# Patient Record
Sex: MALE | Race: BLACK OR AFRICAN AMERICAN | Employment: FULL TIME | URBAN - METROPOLITAN AREA
[De-identification: names, ages, dates, MRNs, and addresses within clinical notes are randomized per-mention and may not be internally consistent; named-entity substitution may affect disease eponyms.]

---

## 2017-09-20 ENCOUNTER — APPOINTMENT (OUTPATIENT)
Dept: CT IMAGING | Age: 52
DRG: 069 | End: 2017-09-20
Attending: EMERGENCY MEDICINE
Payer: COMMERCIAL

## 2017-09-20 ENCOUNTER — APPOINTMENT (OUTPATIENT)
Dept: MRI IMAGING | Age: 52
DRG: 069 | End: 2017-09-20
Attending: INTERNAL MEDICINE
Payer: COMMERCIAL

## 2017-09-20 ENCOUNTER — HOSPITAL ENCOUNTER (INPATIENT)
Age: 52
LOS: 1 days | Discharge: HOME | DRG: 069 | End: 2017-09-21
Attending: EMERGENCY MEDICINE | Admitting: INTERNAL MEDICINE
Payer: COMMERCIAL

## 2017-09-20 DIAGNOSIS — I65.23 STENOSIS OF BOTH INTERNAL CAROTID ARTERIES: ICD-10-CM

## 2017-09-20 DIAGNOSIS — R20.2 PARESTHESIA OF LEFT ARM AND LEG: Primary | ICD-10-CM

## 2017-09-20 DIAGNOSIS — G45.9 TRANSIENT CEREBRAL ISCHEMIA, UNSPECIFIED TYPE: ICD-10-CM

## 2017-09-20 DIAGNOSIS — R93.89 ABNORMAL MRI: ICD-10-CM

## 2017-09-20 DIAGNOSIS — E78.5 DYSLIPIDEMIA: ICD-10-CM

## 2017-09-20 DIAGNOSIS — I67.89 CEREBRAL MICROVASCULAR DISEASE: ICD-10-CM

## 2017-09-20 PROBLEM — I63.9 STROKE (CEREBRUM) (HCC): Status: ACTIVE | Noted: 2017-09-20

## 2017-09-20 LAB
ALBUMIN SERPL-MCNC: 3.9 G/DL (ref 3.5–5)
ALBUMIN/GLOB SERPL: 0.8 {RATIO} (ref 1.1–2.2)
ALP SERPL-CCNC: 59 U/L (ref 45–117)
ALT SERPL-CCNC: 18 U/L (ref 12–78)
ANION GAP SERPL CALC-SCNC: 7 MMOL/L (ref 5–15)
APPEARANCE UR: CLEAR
AST SERPL-CCNC: 17 U/L (ref 15–37)
ATRIAL RATE: 67 BPM
BACTERIA URNS QL MICRO: NEGATIVE /HPF
BASOPHILS # BLD: 0 K/UL (ref 0–0.1)
BASOPHILS NFR BLD: 0 % (ref 0–1)
BILIRUB SERPL-MCNC: 0.3 MG/DL (ref 0.2–1)
BILIRUB UR QL: NEGATIVE
BUN SERPL-MCNC: 7 MG/DL (ref 6–20)
BUN/CREAT SERPL: 10 (ref 12–20)
CALCIUM SERPL-MCNC: 8.9 MG/DL (ref 8.5–10.1)
CALCULATED P AXIS, ECG09: 47 DEGREES
CALCULATED R AXIS, ECG10: 68 DEGREES
CALCULATED T AXIS, ECG11: 51 DEGREES
CHLORIDE SERPL-SCNC: 104 MMOL/L (ref 97–108)
CO2 SERPL-SCNC: 25 MMOL/L (ref 21–32)
COLOR UR: NORMAL
CREAT SERPL-MCNC: 0.68 MG/DL (ref 0.7–1.3)
DIAGNOSIS, 93000: NORMAL
EOSINOPHIL # BLD: 0.1 K/UL (ref 0–0.4)
EOSINOPHIL NFR BLD: 1 % (ref 0–7)
EPITH CASTS URNS QL MICRO: NORMAL /LPF
ERYTHROCYTE [DISTWIDTH] IN BLOOD BY AUTOMATED COUNT: 12.4 % (ref 11.5–14.5)
GLOBULIN SER CALC-MCNC: 4.9 G/DL (ref 2–4)
GLUCOSE SERPL-MCNC: 89 MG/DL (ref 65–100)
GLUCOSE UR STRIP.AUTO-MCNC: NEGATIVE MG/DL
HCT VFR BLD AUTO: 41.5 % (ref 36.6–50.3)
HGB BLD-MCNC: 14.1 G/DL (ref 12.1–17)
HGB UR QL STRIP: NEGATIVE
INR PPP: 1 (ref 0.9–1.1)
KETONES UR QL STRIP.AUTO: NEGATIVE MG/DL
LEUKOCYTE ESTERASE UR QL STRIP.AUTO: NEGATIVE
LYMPHOCYTES # BLD: 3.3 K/UL (ref 0.8–3.5)
LYMPHOCYTES NFR BLD: 47 % (ref 12–49)
MCH RBC QN AUTO: 32.3 PG (ref 26–34)
MCHC RBC AUTO-ENTMCNC: 34 G/DL (ref 30–36.5)
MCV RBC AUTO: 95.2 FL (ref 80–99)
MONOCYTES # BLD: 0.7 K/UL (ref 0–1)
MONOCYTES NFR BLD: 9 % (ref 5–13)
NEUTS SEG # BLD: 3 K/UL (ref 1.8–8)
NEUTS SEG NFR BLD: 43 % (ref 32–75)
NITRITE UR QL STRIP.AUTO: NEGATIVE
P-R INTERVAL, ECG05: 166 MS
PH UR STRIP: 7.5 [PH] (ref 5–8)
PLATELET # BLD AUTO: 229 K/UL (ref 150–400)
POTASSIUM SERPL-SCNC: 3.9 MMOL/L (ref 3.5–5.1)
PROT SERPL-MCNC: 8.8 G/DL (ref 6.4–8.2)
PROT UR STRIP-MCNC: NEGATIVE MG/DL
PROTHROMBIN TIME: 10.3 SEC (ref 9–11.1)
Q-T INTERVAL, ECG07: 394 MS
QRS DURATION, ECG06: 92 MS
QTC CALCULATION (BEZET), ECG08: 416 MS
RBC # BLD AUTO: 4.36 M/UL (ref 4.1–5.7)
RBC #/AREA URNS HPF: NORMAL /HPF (ref 0–5)
SODIUM SERPL-SCNC: 136 MMOL/L (ref 136–145)
SP GR UR REFRACTOMETRY: 1.01 (ref 1–1.03)
TROPONIN I SERPL-MCNC: <0.04 NG/ML
UA: UC IF INDICATED,UAUC: NORMAL
UROBILINOGEN UR QL STRIP.AUTO: 0.2 EU/DL (ref 0.2–1)
VENTRICULAR RATE, ECG03: 67 BPM
WBC # BLD AUTO: 7.1 K/UL (ref 4.1–11.1)
WBC URNS QL MICRO: NORMAL /HPF (ref 0–4)

## 2017-09-20 PROCEDURE — 99285 EMERGENCY DEPT VISIT HI MDM: CPT

## 2017-09-20 PROCEDURE — 93005 ELECTROCARDIOGRAM TRACING: CPT

## 2017-09-20 PROCEDURE — 80053 COMPREHEN METABOLIC PANEL: CPT | Performed by: EMERGENCY MEDICINE

## 2017-09-20 PROCEDURE — 74011250637 HC RX REV CODE- 250/637: Performed by: EMERGENCY MEDICINE

## 2017-09-20 PROCEDURE — 85610 PROTHROMBIN TIME: CPT | Performed by: EMERGENCY MEDICINE

## 2017-09-20 PROCEDURE — 36415 COLL VENOUS BLD VENIPUNCTURE: CPT | Performed by: EMERGENCY MEDICINE

## 2017-09-20 PROCEDURE — 70551 MRI BRAIN STEM W/O DYE: CPT

## 2017-09-20 PROCEDURE — 85025 COMPLETE CBC W/AUTO DIFF WBC: CPT | Performed by: EMERGENCY MEDICINE

## 2017-09-20 PROCEDURE — 81001 URINALYSIS AUTO W/SCOPE: CPT | Performed by: EMERGENCY MEDICINE

## 2017-09-20 PROCEDURE — 70450 CT HEAD/BRAIN W/O DYE: CPT

## 2017-09-20 PROCEDURE — 65660000000 HC RM CCU STEPDOWN

## 2017-09-20 PROCEDURE — 84484 ASSAY OF TROPONIN QUANT: CPT | Performed by: EMERGENCY MEDICINE

## 2017-09-20 RX ORDER — SODIUM CHLORIDE 0.9 % (FLUSH) 0.9 %
5-10 SYRINGE (ML) INJECTION AS NEEDED
Status: DISCONTINUED | OUTPATIENT
Start: 2017-09-20 | End: 2017-09-21 | Stop reason: HOSPADM

## 2017-09-20 RX ORDER — ACETAMINOPHEN 650 MG/1
650 SUPPOSITORY RECTAL
Status: DISCONTINUED | OUTPATIENT
Start: 2017-09-20 | End: 2017-09-21 | Stop reason: HOSPADM

## 2017-09-20 RX ORDER — GUAIFENESIN 100 MG/5ML
81 LIQUID (ML) ORAL DAILY
Status: DISCONTINUED | OUTPATIENT
Start: 2017-09-21 | End: 2017-09-21 | Stop reason: HOSPADM

## 2017-09-20 RX ORDER — GUAIFENESIN 100 MG/5ML
162 LIQUID (ML) ORAL
Status: COMPLETED | OUTPATIENT
Start: 2017-09-20 | End: 2017-09-20

## 2017-09-20 RX ORDER — LABETALOL HYDROCHLORIDE 5 MG/ML
5 INJECTION, SOLUTION INTRAVENOUS
Status: DISCONTINUED | OUTPATIENT
Start: 2017-09-20 | End: 2017-09-21 | Stop reason: HOSPADM

## 2017-09-20 RX ORDER — SODIUM CHLORIDE 0.9 % (FLUSH) 0.9 %
5-10 SYRINGE (ML) INJECTION EVERY 8 HOURS
Status: DISCONTINUED | OUTPATIENT
Start: 2017-09-20 | End: 2017-09-21 | Stop reason: HOSPADM

## 2017-09-20 RX ORDER — ACETAMINOPHEN 325 MG/1
650 TABLET ORAL
Status: DISCONTINUED | OUTPATIENT
Start: 2017-09-20 | End: 2017-09-21 | Stop reason: HOSPADM

## 2017-09-20 RX ORDER — IBUPROFEN 200 MG
400 TABLET ORAL
COMMUNITY

## 2017-09-20 RX ADMIN — ASPIRIN 81 MG 162 MG: 81 TABLET ORAL at 16:50

## 2017-09-20 RX ADMIN — Medication 10 ML: at 21:10

## 2017-09-20 NOTE — H&P
Hospitalist Admission Note    NAME: Boris Lowe   :  1965   MRN:  [id number]     Date/Time:  2017 6:03 PM    Patient PCP: None  ________________________________________________________________________    My assessment of this patient's clinical condition and my plan of care is as follows. Assessment / Plan:  Left upper and lower extremity numbness concerning for stroke  -patient with uncontrolled hypertension, does have evidence of old stroke on CT head   -aspirin 81 mg started, given aspirin 162 mg in ED  -neurology consult  -permissive HTN, labetalol PRN SBP >220  -MRI ordered  -carotid doppler ordered  -echo ordered   -check lipid panel and HbA1c  -swallow screen  -PT/OT consults    Uncontrolled hypertension  -permissive HTN for now  -recommend starting oral antihypertensive tomorrow    Tobacco use  -nicotine patch if needed      Code Status: full  Surrogate Decision Maker: sister Son Fails [phone]    DVT Prophylaxis: scd  GI Prophylaxis: not indicated    Baseline: independent        Subjective:   CHIEF COMPLAINT: left arm and leg numbness    HISTORY OF PRESENT ILLNESS:     Boris Lowe is a 49 y.o.  male with history of hypertension who presents with 12 hour history of left arm and leg numbness. Symptoms started this AM as patient was getting out of the car to go to work. His entire forearm and leg felt numb, he thought his arm \"fell asleep\" however the symptoms did not resolve. Denies any weakness the numbness is now reduced to his finger tips and toes. Denies any history of stroke and is not on any medications. Is seeing a physician currently who was planning to start antihypertensives. We were asked to admit for work up and evaluation of the above problems.      Past Medical History:   Diagnosis Date    Hypertension     Liver disease         Past Surgical History:   Procedure Laterality Date    HX ORTHOPAEDIC Right     Rt hip repair Social History   Substance Use Topics    Smoking status: Current Every Day Smoker     Packs/day: 0.25    Smokeless tobacco: Never Used    Alcohol use No        Family History   Problem Relation Age of Onset    Stroke Father     Kidney Disease Brother      No Known Allergies     Prior to Admission medications    Medication Sig Start Date End Date Taking? Authorizing Provider   ibuprofen (MOTRIN) 200 mg tablet Take 400 mg by mouth every six (6) hours as needed for Pain. Yes Historical Provider   MULTIVIT-MINERALS/FA/LYCOPENE (ONE-A-DAY MEN'S MULTIVITAMIN PO) Take 1 Tab by mouth daily.    Yes Historical Provider       REVIEW OF SYSTEMS:         Total of 12 systems reviewed as follows:         General: no fever, no chills, no sweats, no generalized weakness, no weight loss, no weight gain, no loss of appetite   Eyes: no blurred vision, no eye pain, no loss of vision, no double vision  ENT: no rhinorrhea, no pharyngitis   Respiratory: no cough, no sputum production, no SOB, no FARIA, no wheezing, no pleuritic pain   Cardiology: no chest pain, no palpitations, no orthopnea, no PND, no edema, no syncope   Gastrointestinal: no abdominal pain, no N/V, no diarrhea, no dysphagia, no constipation, no bleeding   Genitourinary: no frequency, no urgency, no dysuria, no hematuria, no incontinence   Muskuloskeletal : no arthralgia, no myalgia, no back pain  Hematology: no easy bruising, no nose or gum bleeding, no lymphadenopathy   Dermatological: no rash, no ulceration, no pruritis, no color change / jaundice  Endocrine: no hot flashes, no polydipsia   Neurological: no headache, no dizziness, no confusion, no focal weakness, + paresthesia, no speech difficulties, no memory loss, no gait difficulty  Psychological: no anxiety, no depression, no agitation      Objective:   VITALS:    Patient Vitals for the past 12 hrs:   Temp Pulse Resp BP SpO2   09/20/17 1700 - - - (!) 151/100 97 %   09/20/17 1530 - - - 138/87 97 % 09/20/17 1515 - - - (!) 142/99 97 %   09/20/17 1500 - - - (!) 155/94 97 %   09/20/17 1333 98.2 °F (36.8 °C) 70 18 (!) 157/104 100 %         PHYSICAL EXAM:    General:    Alert, cooperative, no distress, appears stated age. HEENT: Atraumatic, anicteric sclerae, pink conjunctivae     No oral ulcers, oral mucosa moist, throat clear, dentition fair  Neck:  Supple, symmetrical  Lungs:   Clear to auscultation bilaterally, no wheezing, rhonchi, or rales. Chest wall:  No tenderness, no accessory muscle use. Heart:   Regular rhythm, no murmur, no edema  Abdomen:   Soft, non-tender, not distended, bowel sounds normal  Extremities: No cyanosis, no clubbing, warm, well perfused, radial pulse 2+  Skin:     Not pale, not jaundiced, no rashes   Psych:  Good insight, not depressed, not anxious or agitated. Neurologic: EOMs intact, face symmetric, no aphasia or slurred speech, strength 5/5 in BUE, 5/5 in BLE, sensation grossly intact, alert and oriented x 4.     _______________________________________________________________________  Care Plan discussed with:    Comments   Patient     Family      RN     Care Manager                    Consultant:      _______________________________________________________________________  Expected  Disposition:   Home with Family    HH/PT/OT/RN    SNF/LTC    JACOB    ________________________________________________________________________  TOTAL TIME:  61 Minutes    Critical Care Provided     Minutes non procedure based      Comments     Reviewed previous records   >50% of visit spent in counseling and coordination of care  Discussion with patient and/or family and questions answered       ________________________________________________________________________  Ignacio Ashley MD    Procedures: see electronic medical records for all procedures/Xrays and details which were not copied into this note but were reviewed prior to creation of Plan.     LAB DATA REVIEWED:    Recent Results (from the past 24 hour(s))   EKG, 12 LEAD, INITIAL    Collection Time: 09/20/17  1:38 PM   Result Value Ref Range    Ventricular Rate 67 BPM    Atrial Rate 67 BPM    P-R Interval 166 ms    QRS Duration 92 ms    Q-T Interval 394 ms    QTC Calculation (Bezet) 416 ms    Calculated P Axis 47 degrees    Calculated R Axis 68 degrees    Calculated T Axis 51 degrees    Diagnosis       Normal sinus rhythm  Minimal voltage criteria for LVH, may be normal variant  Minor nonspecific st&t changes    No previous ECGs available  Confirmed by Darrius Delgado ([id number]) on 9/20/2017 5:31:05 PM     CBC WITH AUTOMATED DIFF    Collection Time: 09/20/17  1:52 PM   Result Value Ref Range    WBC 7.1 4.1 - 11.1 K/uL    RBC 4.36 4.10 - 5.70 M/uL    HGB 14.1 12.1 - 17.0 g/dL    HCT 41.5 36.6 - 50.3 %    MCV 95.2 80.0 - 99.0 FL    MCH 32.3 26.0 - 34.0 PG    MCHC 34.0 30.0 - 36.5 g/dL    RDW 12.4 11.5 - 14.5 %    PLATELET 653 691 - 060 K/uL    NEUTROPHILS 43 32 - 75 %    LYMPHOCYTES 47 12 - 49 %    MONOCYTES 9 5 - 13 %    EOSINOPHILS 1 0 - 7 %    BASOPHILS 0 0 - 1 %    ABS. NEUTROPHILS 3.0 1.8 - 8.0 K/UL    ABS. LYMPHOCYTES 3.3 0.8 - 3.5 K/UL    ABS. MONOCYTES 0.7 0.0 - 1.0 K/UL    ABS. EOSINOPHILS 0.1 0.0 - 0.4 K/UL    ABS. BASOPHILS 0.0 0.0 - 0.1 K/UL   METABOLIC PANEL, COMPREHENSIVE    Collection Time: 09/20/17  1:52 PM   Result Value Ref Range    Sodium 136 136 - 145 mmol/L    Potassium 3.9 3.5 - 5.1 mmol/L    Chloride 104 97 - 108 mmol/L    CO2 25 21 - 32 mmol/L    Anion gap 7 5 - 15 mmol/L    Glucose 89 65 - 100 mg/dL    BUN 7 6 - 20 MG/DL    Creatinine 0.68 (L) 0.70 - 1.30 MG/DL    BUN/Creatinine ratio 10 (L) 12 - 20      GFR est AA >60 >60 ml/min/1.73m2    GFR est non-AA >60 >60 ml/min/1.73m2    Calcium 8.9 8.5 - 10.1 MG/DL    Bilirubin, total 0.3 0.2 - 1.0 MG/DL    ALT (SGPT) 18 12 - 78 U/L    AST (SGOT) 17 15 - 37 U/L    Alk.  phosphatase 59 45 - 117 U/L    Protein, total 8.8 (H) 6.4 - 8.2 g/dL    Albumin 3.9 3.5 - 5.0 g/dL    Globulin 4.9 (H) 2.0 - 4.0 g/dL    A-G Ratio 0.8 (L) 1.1 - 2.2     TROPONIN I    Collection Time: 09/20/17  1:52 PM   Result Value Ref Range    Troponin-I, Qt. <0.04 <0.05 ng/mL   URINALYSIS W/ REFLEX CULTURE    Collection Time: 09/20/17  2:02 PM   Result Value Ref Range    Color YELLOW/STRAW      Appearance CLEAR CLEAR      Specific gravity 1.013 1.003 - 1.030      pH (UA) 7.5 5.0 - 8.0      Protein NEGATIVE  NEG mg/dL    Glucose NEGATIVE  NEG mg/dL    Ketone NEGATIVE  NEG mg/dL    Bilirubin NEGATIVE  NEG      Blood NEGATIVE  NEG      Urobilinogen 0.2 0.2 - 1.0 EU/dL    Nitrites NEGATIVE  NEG      Leukocyte Esterase NEGATIVE  NEG      WBC 0-4 0 - 4 /hpf    RBC 0-5 0 - 5 /hpf    Epithelial cells FEW FEW /lpf    Bacteria NEGATIVE  NEG /hpf    UA:UC IF INDICATED CULTURE NOT INDICATED BY UA RESULT CNI     PROTHROMBIN TIME + INR    Collection Time: 09/20/17  3:31 PM   Result Value Ref Range    INR 1.0 0.9 - 1.1      Prothrombin time 10.3 9.0 - 11.1 sec

## 2017-09-20 NOTE — ED NOTES
TRANSFER - OUT REPORT:    Verbal report given to Bennie Parekh RN (name) on Arabella Gibbs  being transferred to [address] [place] [address]  (unit) for routine progression of care       Report consisted of patients Situation, Background, Assessment and   Recommendations(SBAR). Information from the following report(s) SBAR, Kardex, ED Summary, MAR, Accordion and Recent Results was reviewed with the receiving nurse. Lines:   Peripheral IV 09/20/17 Left Antecubital (Active)   Site Assessment Clean, dry, & intact 9/20/2017  3:04 PM   Phlebitis Assessment 0 9/20/2017  3:04 PM   Infiltration Assessment 0 9/20/2017  3:04 PM   Dressing Status Clean, dry, & intact 9/20/2017  3:04 PM   Dressing Type Transparent;Tape 9/20/2017  3:04 PM   Hub Color/Line Status Green 9/20/2017  3:04 PM        Opportunity for questions and clarification was provided.       Patient transported with:   [employer]

## 2017-09-20 NOTE — ED TRIAGE NOTES
Patient presents to ED ambulatory with a c/o parasthesia that started in left foot traveling up the knee and thigh later in the day. He reports that his left hand and arm is also experiencing tingling. Denies any injury or surgeries to neck or back previously.

## 2017-09-20 NOTE — IP AVS SNAPSHOT
[address] [phone] Patient: Luis Hash MRN: [id number] :1965 You are allergic to the following No active allergies Recent Documentation Height Weight BMI Smoking Status 1.803 m 68 kg 20.92 kg/m2 Current Every Day Smoker Emergency Contacts Name Discharge Info Relation Home Work Mobile Izzy Mackenzie  Other Relative [6] [phone] About your hospitalization You were admitted on:  2017 You last received care in the:  [facility] [address] [facility]UROSCI[facility]LE[facility] You were discharged on:  2017 Unit phone number:  [phone] Why you were hospitalized Your primary diagnosis was:  Not on File Your diagnoses also included:  Stroke (Cerebrum) ([facility]) Providers Seen During Your Hospitalizations Provider Role Specialty Primary office phone Gilles Perez MD Attending Provider Emergency Medicine [phone] Sandhya Toure MD Attending Provider Internal Medicine [phone] Your Primary Care Physician (PCP) Primary Care Physician Office Phone Office Fax Pineda Douglas [phone] Follow-up Information Follow up With Details Comments Contact Info Yao House NP On 10/26/2017 9:30am Neurology follow-up   Please arrive 20min early and bring a photo ID, insurance card and a list of medications [address] [place] [address] [address] [facility] 
[phone] Melody Alejo MD  check [address] [place] [address] 
[phone] Your Appointments   9:30 AM EDT New Patient with Yao House NP Neurology Clinic at [facility] [address] [facility]) [address] [facility], 
[address] [facility], [address] [facility] [address] [phone] Current Discharge Medication List  
  
 START taking these medications Dose & Instructions Dispensing Information Comments Morning Noon Evening Bedtime  
 amLODIPine 10 mg tablet Commonly known as:  Guillermo Dimas Your last dose was: Your next dose is:    
   
   
 Dose:  2.5 mg Take 0.5 Tabs by mouth daily. Quantity:  30 Tab Refills:  0  
     
   
   
   
  
 aspirin 81 mg chewable tablet Your last dose was: Your next dose is:    
   
   
 Dose:  81 mg Take 1 Tab by mouth daily. Quantity:  30 Tab Refills:  0  
     
   
   
   
  
 atorvastatin 10 mg tablet Commonly known as:  LIPITOR Your last dose was: Your next dose is:    
   
   
 Dose:  10 mg Take 1 Tab by mouth nightly. Quantity:  30 Tab Refills:  0 CONTINUE these medications which have NOT CHANGED Dose & Instructions Dispensing Information Comments Morning Noon Evening Bedtime  
 ibuprofen 200 mg tablet Commonly known as:  MOTRIN Your last dose was: Your next dose is:    
   
   
 Dose:  400 mg Take 400 mg by mouth every six (6) hours as needed for Pain. Refills:  0  
     
   
   
   
  
 ONE-A-DAY MEN'S MULTIVITAMIN PO Your last dose was: Your next dose is:    
   
   
 Dose:  1 Tab Take 1 Tab by mouth daily. Refills:  0 Where to Get Your Medications Information on where to get these meds will be given to you by the nurse or doctor. ! Ask your nurse or doctor about these medications  
  amLODIPine 10 mg tablet  
 aspirin 81 mg chewable tablet  
 atorvastatin 10 mg tablet Discharge Instructions None Discharge Orders None [facility] Announcement We are excited to announce that we are making your provider's discharge notes available to you in [employer].   You will see these notes when they are completed and signed by the physician that discharged you from your recent hospital stay. If you have any questions or concerns about any information you see in [employer], please call the Health Information Department where you were seen or reach out to your Primary Care Provider for more information about your plan of care. Introducing [facility]! Elías Farooq introduces [employer] patient portal. Now you can access parts of your medical record, email your doctor's office, and request medication refills online. 1. In your internet browser, go to [email or web address] Our Security Team/[employer] 2. Click on the First Time User? Click Here link in the Sign In box. You will see the New Member Sign Up page. 3. Enter your [employer] Access Code exactly as it appears below. You will not need to use this code after youve completed the sign-up process. If you do not sign up before the expiration date, you must request a new code. · [employer] Access Code: [email or web address] Expires: 12/19/2017  2:12 PM 
 
4. Enter the last four digits of your Social Security Number (xxxx) and Date of Birth (mm/dd/yyyy) as indicated and click Submit. You will be taken to the next sign-up page. 5. Create a [employer] ID. This will be your [employer] login ID and cannot be changed, so think of one that is secure and easy to remember. 6. Create a [employer] password. You can change your password at any time. 7. Enter your Password Reset Question and Answer. This can be used at a later time if you forget your password. 8. Enter your e-mail address. You will receive e-mail notification when new information is available in [address] 9. Click Sign Up. You can now view and download portions of your medical record. 10. Click the Download Summary menu link to download a portable copy of your medical information. If you have questions, please visit the Frequently Asked Questions section of the [employer] website. Remember, [employer] is NOT to be used for urgent needs. For medical emergencies, dial 911. Now available from your iPhone and Android! General Information Please provide this summary of care documentation to your next provider. Patient Signature:  ____________________________________________________________ Date:  ____________________________________________________________  
  
Damian Cart Provider Signature:  ____________________________________________________________ Date:  ____________________________________________________________

## 2017-09-20 NOTE — IP AVS SNAPSHOT
[address] [facility] 
[phone] Patient: Mckenna Schaffer MRN: [id number] :1965 Current Discharge Medication List  
  
START taking these medications Dose & Instructions Dispensing Information Comments Morning Noon Evening Bedtime  
 amLODIPine 10 mg tablet Commonly known as:  Viveros Fermin Your last dose was: Your next dose is:    
   
   
 Dose:  2.5 mg Take 0.5 Tabs by mouth daily. Quantity:  30 Tab Refills:  0  
     
   
   
   
  
 aspirin 81 mg chewable tablet Your last dose was: Your next dose is:    
   
   
 Dose:  81 mg Take 1 Tab by mouth daily. Quantity:  30 Tab Refills:  0  
     
   
   
   
  
 atorvastatin 10 mg tablet Commonly known as:  LIPITOR Your last dose was: Your next dose is:    
   
   
 Dose:  10 mg Take 1 Tab by mouth nightly. Quantity:  30 Tab Refills:  0 CONTINUE these medications which have NOT CHANGED Dose & Instructions Dispensing Information Comments Morning Noon Evening Bedtime  
 ibuprofen 200 mg tablet Commonly known as:  MOTRIN Your last dose was: Your next dose is:    
   
   
 Dose:  400 mg Take 400 mg by mouth every six (6) hours as needed for Pain. Refills:  0  
     
   
   
   
  
 ONE-A-DAY MEN'S MULTIVITAMIN PO Your last dose was: Your next dose is:    
   
   
 Dose:  1 Tab Take 1 Tab by mouth daily. Refills:  0 Where to Get Your Medications Information on where to get these meds will be given to you by the nurse or doctor. ! Ask your nurse or doctor about these medications  
  amLODIPine 10 mg tablet  
 aspirin 81 mg chewable tablet  
 atorvastatin 10 mg tablet

## 2017-09-20 NOTE — ROUTINE PROCESS

## 2017-09-20 NOTE — PROGRESS NOTES
Pharmacy Clarification of Prior to Admission Medication Regimen     The patient was interviewed regarding clarification of the prior to admission medication regimen, and was questioned regarding use of any other inhalers, topical products, over the counter medications, herbal medications, vitamin products or ophthalmic/nasal/otic medication use. Information Obtained From: Patient, RX Query    Pertinent Pharmacy Findings:   MULTIVIT-MINERALS/FA/LYCOPENE (ONE-A-DAY MEN'S MULTIVITAMIN PO): Patient takes this OTC agent at home, but was unaware of the strength. PTA medication list was corrected to the following:     Prior to Admission Medications   Prescriptions Last Dose Informant Patient Reported? Taking? MULTIVIT-MINERALS/FA/LYCOPENE (ONE-A-DAY MEN'S MULTIVITAMIN PO) 9/17/2017 at Unknown time Self Yes Yes   Sig: Take 1 Tab by mouth daily. ibuprofen (MOTRIN) 200 mg tablet 9/18/2017 at Unknown time Self Yes Yes   Sig: Take 400 mg by mouth every six (6) hours as needed for Pain.       Facility-Administered Medications: None          Thank you,  Stiven Ortiz CPhT  Medication History Pharmacy Technician

## 2017-09-20 NOTE — PROGRESS NOTES
TRANSFER - IN REPORT:    Verbal report received from Yudelka(name) on Shavon Solano  being received from ED(unit) for routine progression of care      Report consisted of patients Situation, Background, Assessment and   Recommendations(SBAR). Information from the following report(s) SBAR, Kardex, ED Summary, [facility] and Recent Results was reviewed with the receiving nurse. Opportunity for questions and clarification was provided. Assessment completed upon patients arrival to unit and care assumed. Primary Nurse Sandy Langford RN and Soheila López RN performed a dual skin assessment on this patient No impairment noted  Andrés score is 23    Stroke Education provided to patient and the following topics were discussed    1. Patients personal risk factors for stroke are hypertension, smoking and family history    2. Warning signs of Stroke:        * Sudden numbness or weakness of the face, arm or leg, especially on one side of          The body            * Sudden confusion, trouble speaking or understanding        * Sudden trouble seeing in one or both eyes        * Sudden trouble walking, dizziness, loss of balance or coordination        * Sudden severe headache with no known cause      3. Importance of activation Emergency Medical Services ( 9-1-1 ) immediately if experience any warning signs of stroke. 4. Be sure and schedule a follow-up appointment with your primary care doctor or any specialists as instructed. 5. You must take medicine every day to treat your risk factors for stroke. Be sure to take your medicines exactly as your doctor tells you: no more, no less. Know what your medicines are for , what they do. Anti-thrombotics /anticoagulants can help prevent strokes. 6.  Smoking and second-hand smoke greatly increase your risk of stroke, cardiovascular disease and death. Smoking history packs, 1 per day    7.  Information provided was [facility] Stroke Education Binder or Verbal Education    8. Documentation of teaching completed in Patient Education Activity and on Care Plan with teaching response noted?   yes

## 2017-09-20 NOTE — ED PROVIDER NOTES
[facility].  EMERGENCY DEPARTMENT HISTORY AND PHYSICAL EXAM       Date of Service: 9/20/2017   Patient Name: Bennett Stack   YOB: 1965  Medical Record Number: [id number]    History of Presenting Illness     Chief Complaint   Patient presents with    Numbness     per EMS pt reports tingling to L leg onset at 0630 this morning and now tingling is in L hand as well        History Provided By:  patient    Additional History: Bennett Stack is a 49 y.o. male who presents via EMS to the ED with cc of acute onset, gradually worsening paraesthesias in his LUE and LLE ~6:30 AM. Pt reports initially experiencing parasthesias in this L foot and L hand after arriving to work at [address] [place] [address] Rd., [address] this morning, which has progressed into his L leg and L arm. He states his symptoms have been constant since onset. Pt denies any similar symptoms prior to today. He specifically denies any recent facial numbness, R sided numbness, speech difficulty, unilateral weakness, HA, vision changes, CP, abdominal pain, or N/V/D. He reports a hx of hepatitis C and HTN, but does not take any daily medications. Social Hx: + Tobacco, - EtOH, - Illicit Drugs    There are no other complaints, changes or physical findings at this time. Primary Care Provider: None       Past History     Past Medical History:   Past Medical History:   Diagnosis Date    Hypertension     Liver disease         Past Surgical History:   Past Surgical History:   Procedure Laterality Date    HX ORTHOPAEDIC Right 1998    Rt hip repair        Family History:   History reviewed. No pertinent family history. Social History:   Social History   Substance Use Topics    Smoking status: Current Every Day Smoker     Packs/day: 0.25    Smokeless tobacco: Never Used    Alcohol use No        Allergies:   No Known Allergies      Review of Systems   Review of Systems   Constitutional: Negative for chills, fatigue and fever.    HENT: Negative for congestion, rhinorrhea and sore throat. Eyes: Negative for pain, discharge and visual disturbance. Respiratory: Negative for cough, chest tightness, shortness of breath and wheezing. Cardiovascular: Negative for chest pain, palpitations and leg swelling. Gastrointestinal: Negative for abdominal pain, constipation, diarrhea, nausea and vomiting. Genitourinary: Negative for dysuria, frequency and hematuria. Musculoskeletal: Negative for arthralgias, back pain and myalgias. Skin: Negative for rash. Neurological: Positive for numbness (paraesthesias LUE and LLE). Negative for dizziness, speech difficulty, weakness, light-headedness and headaches.        -facial numbness  -R sided paraesthesias   Psychiatric/Behavioral: Negative. Physical Exam  Physical Exam   Constitutional: He is oriented to person, place, and time. He appears well-developed and well-nourished. No distress. HENT:   Head: Normocephalic and atraumatic. Eyes: EOM are normal. Right eye exhibits no discharge. Left eye exhibits no discharge. No scleral icterus. Neck: Normal range of motion. Neck supple. No tracheal deviation present. Cardiovascular: Normal rate, regular rhythm, normal heart sounds and intact distal pulses. Exam reveals no gallop and no friction rub. No murmur heard. Pulmonary/Chest: Effort normal and breath sounds normal. No respiratory distress. He has no wheezes. He has no rales. Abdominal: Soft. He exhibits no distension. There is no tenderness. Musculoskeletal: Normal range of motion. He exhibits no edema. Lymphadenopathy:     He has no cervical adenopathy. Neurological: He is alert and oriented to person, place, and time. Normal speech. Facial symmetry. 5/5 strength in all extremities. Finger-nose-finger and heel-to-shin intact. No pronator drift. Gait stable. Skin: Skin is warm and dry. No rash noted. Psychiatric: He has a normal mood and affect.    Nursing note and vitals reviewed. Medical Decision Making   I am the first provider for this patient. I reviewed the vital signs, available nursing notes, past medical history, past surgical history, family history and social history. Old Medical Records: Old medical records. Provider Notes:   Patient is a 48 yo male who presents to ED with LUE/LLE paresthesias since 0630 this morning. He is neurologically intact on exam.  He is not a tPA candidate due to onset of symptoms and minimal NIH scale. Differential includes TIA, CVA, ICH, hypoglycemia, ACS. - CBC, CMP  - Mainor  - Coags  - HCT  - Admission for stroke work up    ED Course:  3:07 PM   Initial assessment performed. The patients presenting problems have been discussed, and they are in agreement with the care plan formulated and outlined with them. I have encouraged them to ask questions as they arise throughout their visit. Consults   CONSULT NOTE:   3:55 PM  Reinaldo Romberg, MD spoke with Dr. Stefan Sierra,   Specialty: neurology  Discussed pt's hx, disposition, and available diagnostic and imaging results. Reviewed care plans. Consultant agrees with plans as outlined. She recommends admission for routine stroke workup. Written by Tonja Skaggs, ED Scribe, as dictated by Reinaldo Romberg, MD.    CONSULT NOTE:   4:16 PM  Reinaldo Romberg, MD spoke with Dr. Naun Woodall,   Specialty: Hospitalist  Discussed pt's hx, disposition, and available diagnostic and imaging results. Reviewed care plans. Consultant will evaluate pt for admission.   Written by Tonja Skaggs ED Scribe, as dictated by Reinaldo Romberg, MD.    Diagnostic Study Results   Labs -   Recent Results (from the past 12 hour(s))   EKG, 12 LEAD, INITIAL    Collection Time: 09/20/17  1:38 PM   Result Value Ref Range    Ventricular Rate 67 BPM    Atrial Rate 67 BPM    P-R Interval 166 ms    QRS Duration 92 ms    Q-T Interval 394 ms    QTC Calculation (Bezet) 416 ms    Calculated P Axis 47 degrees    Calculated R Axis 68 degrees    Calculated T Axis 51 degrees    Diagnosis       Normal sinus rhythm  Minimal voltage criteria for LVH, may be normal variant  Borderline ECG  No previous ECGs available     CBC WITH AUTOMATED DIFF    Collection Time: 09/20/17  1:52 PM   Result Value Ref Range    WBC 7.1 4.1 - 11.1 K/uL    RBC 4.36 4.10 - 5.70 M/uL    HGB 14.1 12.1 - 17.0 g/dL    HCT 41.5 36.6 - 50.3 %    MCV 95.2 80.0 - 99.0 FL    MCH 32.3 26.0 - 34.0 PG    MCHC 34.0 30.0 - 36.5 g/dL    RDW 12.4 11.5 - 14.5 %    PLATELET 472 358 - 796 K/uL    NEUTROPHILS 43 32 - 75 %    LYMPHOCYTES 47 12 - 49 %    MONOCYTES 9 5 - 13 %    EOSINOPHILS 1 0 - 7 %    BASOPHILS 0 0 - 1 %    ABS. NEUTROPHILS 3.0 1.8 - 8.0 K/UL    ABS. LYMPHOCYTES 3.3 0.8 - 3.5 K/UL    ABS. MONOCYTES 0.7 0.0 - 1.0 K/UL    ABS. EOSINOPHILS 0.1 0.0 - 0.4 K/UL    ABS. BASOPHILS 0.0 0.0 - 0.1 K/UL   METABOLIC PANEL, COMPREHENSIVE    Collection Time: 09/20/17  1:52 PM   Result Value Ref Range    Sodium 136 136 - 145 mmol/L    Potassium 3.9 3.5 - 5.1 mmol/L    Chloride 104 97 - 108 mmol/L    CO2 25 21 - 32 mmol/L    Anion gap 7 5 - 15 mmol/L    Glucose 89 65 - 100 mg/dL    BUN 7 6 - 20 MG/DL    Creatinine 0.68 (L) 0.70 - 1.30 MG/DL    BUN/Creatinine ratio 10 (L) 12 - 20      GFR est AA >60 >60 ml/min/1.73m2    GFR est non-AA >60 >60 ml/min/1.73m2    Calcium 8.9 8.5 - 10.1 MG/DL    Bilirubin, total 0.3 0.2 - 1.0 MG/DL    ALT (SGPT) 18 12 - 78 U/L    AST (SGOT) 17 15 - 37 U/L    Alk.  phosphatase 59 45 - 117 U/L    Protein, total 8.8 (H) 6.4 - 8.2 g/dL    Albumin 3.9 3.5 - 5.0 g/dL    Globulin 4.9 (H) 2.0 - 4.0 g/dL    A-G Ratio 0.8 (L) 1.1 - 2.2     TROPONIN I    Collection Time: 09/20/17  1:52 PM   Result Value Ref Range    Troponin-I, Qt. <0.04 <0.05 ng/mL   URINALYSIS W/ REFLEX CULTURE    Collection Time: 09/20/17  2:02 PM   Result Value Ref Range    Color YELLOW/STRAW      Appearance CLEAR CLEAR      Specific gravity 1.013 1.003 - 1.030      pH (UA) 7.5 5.0 - 8.0      Protein NEGATIVE  NEG mg/dL    Glucose NEGATIVE  NEG mg/dL    Ketone NEGATIVE  NEG mg/dL    Bilirubin NEGATIVE  NEG      Blood NEGATIVE  NEG      Urobilinogen 0.2 0.2 - 1.0 EU/dL    Nitrites NEGATIVE  NEG      Leukocyte Esterase NEGATIVE  NEG      WBC 0-4 0 - 4 /hpf    RBC 0-5 0 - 5 /hpf    Epithelial cells FEW FEW /lpf    Bacteria NEGATIVE  NEG /hpf    UA:UC IF INDICATED CULTURE NOT INDICATED BY UA RESULT CNI     PROTHROMBIN TIME + INR    Collection Time: 09/20/17  3:31 PM   Result Value Ref Range    INR 1.0 0.9 - 1.1      Prothrombin time 10.3 9.0 - 11.1 sec       Radiologic Studies -  The following have been ordered and reviewed:  CT Results  (Last 48 hours)               09/20/17 1551  CT HEAD WO CONT Final result    Impression:  Impression:    1. No evidence of acute infarct or intracranial hemorrhage. 2. Chronic left basal ganglia lacunar infarct. 3. Mild periventricular white matter disease is likely secondary to chronic   small vessel ischemic changes. Narrative: Indication:  left arm/leg paresthesias        Comparison: None       Findings: 5 mm axial images were obtained from the skull base through the   vertex. CT dose reduction was achieved through the use of a standardized protocol   tailored for this examination and automatic exposure control for dose   modulation. The ventricles and cortical sulci are prominent, compatible with age related   volume loss. There is no evidence of intracranial hemorrhage, mass, mass effect,   or acute infarct. There is a left basal ganglia lacunar infarct. There is   periventricular white matter disease. No extra-axial fluid collections are seen. The visualized paranasal sinuses and mastoid air cells are clear. The orbital   structures are unremarkable. No osseous abnormalities are seen. Vital Signs-Reviewed the patient's vital signs.    Patient Vitals for the past 12 hrs:   Temp Pulse Resp BP SpO2   09/20/17 1333 98.2 °F (36.8 °C) 70 18 (!) 157/104 100 %       Medications Given in the ED:  Medications   aspirin chewable tablet 162 mg (not administered)       EKG interpretation: (Preliminary) 13:38  Rhythm: normal sinus rhythm; and regular . Rate (approx.): 67; Axis: normal; WI interval: normal; QRS interval: normal ; ST/T wave: normal.    Diagnosis   Clinical Impression:   1. Paresthesia of left arm and leg         Plan:  1: Admit to Hospitalist    Admission Note:  4:20 PM  Patient is being admitted to the hospital by Dr. Prieto Lerma. The results of their tests and reasons for their admission have been discussed with them and available family. They convey agreement and understanding for the need to be admitted and for their admission diagnosis. Written by Robson Raines, ED Scribe, as dictated by Samantha Izaguirre MD.  _______________________________   Attestations: This note is prepared by Robson Raines, acting as Scribe for Samantha Izaguirre MD.      The scribe's documentation has been prepared under my direction and personally reviewed by me in its entirety. I confirm that the note above accurately reflects all work, treatment, procedures, and medical decision making performed by me.   Samantha Izaguirre MD  _______________________________

## 2017-09-21 VITALS
TEMPERATURE: 98.2 F | DIASTOLIC BLOOD PRESSURE: 76 MMHG | OXYGEN SATURATION: 99 % | BODY MASS INDEX: 21 KG/M2 | RESPIRATION RATE: 16 BRPM | HEIGHT: 71 IN | SYSTOLIC BLOOD PRESSURE: 132 MMHG | WEIGHT: 150 LBS | HEART RATE: 62 BPM

## 2017-09-21 PROBLEM — I67.89 CEREBRAL MICROVASCULAR DISEASE: Status: ACTIVE | Noted: 2017-09-21

## 2017-09-21 PROBLEM — I65.23 STENOSIS OF BOTH INTERNAL CAROTID ARTERIES: Status: ACTIVE | Noted: 2017-09-21

## 2017-09-21 LAB
CHOLEST SERPL-MCNC: 146 MG/DL
EST. AVERAGE GLUCOSE BLD GHB EST-MCNC: 108 MG/DL
HBA1C MFR BLD: 5.4 % (ref 4.2–6.3)
HDLC SERPL-MCNC: 44 MG/DL
HDLC SERPL: 3.3 {RATIO} (ref 0–5)
LDLC SERPL CALC-MCNC: 77 MG/DL (ref 0–100)
LIPID PROFILE,FLP: NORMAL
TRIGL SERPL-MCNC: 125 MG/DL (ref ?–150)
VLDLC SERPL CALC-MCNC: 25 MG/DL

## 2017-09-21 PROCEDURE — 83036 HEMOGLOBIN GLYCOSYLATED A1C: CPT | Performed by: INTERNAL MEDICINE

## 2017-09-21 PROCEDURE — 97535 SELF CARE MNGMENT TRAINING: CPT | Performed by: OCCUPATIONAL THERAPIST

## 2017-09-21 PROCEDURE — 93306 TTE W/DOPPLER COMPLETE: CPT

## 2017-09-21 PROCEDURE — G8979 MOBILITY GOAL STATUS: HCPCS

## 2017-09-21 PROCEDURE — 97165 OT EVAL LOW COMPLEX 30 MIN: CPT | Performed by: OCCUPATIONAL THERAPIST

## 2017-09-21 PROCEDURE — 36415 COLL VENOUS BLD VENIPUNCTURE: CPT | Performed by: INTERNAL MEDICINE

## 2017-09-21 PROCEDURE — 97161 PT EVAL LOW COMPLEX 20 MIN: CPT

## 2017-09-21 PROCEDURE — G8988 SELF CARE GOAL STATUS: HCPCS | Performed by: OCCUPATIONAL THERAPIST

## 2017-09-21 PROCEDURE — 74011250637 HC RX REV CODE- 250/637: Performed by: INTERNAL MEDICINE

## 2017-09-21 PROCEDURE — G8987 SELF CARE CURRENT STATUS: HCPCS | Performed by: OCCUPATIONAL THERAPIST

## 2017-09-21 PROCEDURE — G8989 SELF CARE D/C STATUS: HCPCS | Performed by: OCCUPATIONAL THERAPIST

## 2017-09-21 PROCEDURE — 93880 EXTRACRANIAL BILAT STUDY: CPT

## 2017-09-21 PROCEDURE — 80061 LIPID PANEL: CPT | Performed by: INTERNAL MEDICINE

## 2017-09-21 PROCEDURE — G8980 MOBILITY D/C STATUS: HCPCS

## 2017-09-21 PROCEDURE — G8978 MOBILITY CURRENT STATUS: HCPCS

## 2017-09-21 RX ORDER — GUAIFENESIN 100 MG/5ML
81 LIQUID (ML) ORAL DAILY
Qty: 30 TAB | Refills: 0 | Status: SHIPPED | OUTPATIENT
Start: 2017-09-21

## 2017-09-21 RX ORDER — AMLODIPINE BESYLATE 2.5 MG/1
2.5 TABLET ORAL DAILY
Qty: 30 TAB | Refills: 0 | Status: SHIPPED | OUTPATIENT
Start: 2017-09-21 | End: 2017-09-21

## 2017-09-21 RX ORDER — ATORVASTATIN CALCIUM 10 MG/1
10 TABLET, FILM COATED ORAL
Status: DISCONTINUED | OUTPATIENT
Start: 2017-09-21 | End: 2017-09-21 | Stop reason: HOSPADM

## 2017-09-21 RX ORDER — AMLODIPINE BESYLATE 10 MG/1
2.5 TABLET ORAL DAILY
Qty: 30 TAB | Refills: 0 | Status: SHIPPED | OUTPATIENT
Start: 2017-09-21 | End: 2017-09-21

## 2017-09-21 RX ORDER — ATORVASTATIN CALCIUM 10 MG/1
10 TABLET, FILM COATED ORAL
Qty: 30 TAB | Refills: 0 | Status: SHIPPED | OUTPATIENT
Start: 2017-09-21

## 2017-09-21 RX ORDER — AMLODIPINE BESYLATE 2.5 MG/1
2.5 TABLET ORAL DAILY
Qty: 30 TAB | Refills: 0 | Status: SHIPPED | OUTPATIENT
Start: 2017-09-21

## 2017-09-21 RX ADMIN — Medication 10 ML: at 05:28

## 2017-09-21 RX ADMIN — ASPIRIN 81 MG 81 MG: 81 TABLET ORAL at 09:11

## 2017-09-21 NOTE — PROGRESS NOTES
F/u appointments made and documented on the discharge paperwork. Pt's friend will transport pt home by car. Pt in good spirits and ready for discharge. Care Management Interventions  PCP Verified by CM: Yes  Mode of Transport at Discharge:  Other (see comment) (friend is driving pt home)  Hospital Transport Time of Discharge: [address] (CM Consult): Discharge Planning  Discharge Durable Medical Equipment: No  Physical Therapy Consult: Yes  Occupational Therapy Consult: Yes  Speech Therapy Consult: Yes  Confirm Follow Up Transport: Friends  Plan discussed with Pt/Family/Caregiver: Yes  Discharge Location  [facility] [address] [place], [address] [facility]

## 2017-09-21 NOTE — PROGRESS NOTES
Problem: Patient Education: Go to Patient Education Activity  Goal: Patient/Family Education  Outcome: Progressing Towards Goal  Pt given stroke education binder and educated     Problem: TIA: First 24 hours  Goal: Activity/Safety  Outcome: Progressing Towards Goal  Remains free from falls, ambulates without distress with a steady gait  Goal: Nutrition/Diet  Outcome: Progressing Towards Goal  Tolerating diet  Goal: Respiratory  Outcome: Progressing Towards Goal  Oxygen saturation maintained at or above 95% on room air

## 2017-09-21 NOTE — DISCHARGE SUMMARY
Discharge Summary      Name: Leandro Infante  [id number]  YOB: 1965 (Age: 49 y.o.)   Date of Admission: 9/20/2017  Date of Discharge: 9/21/2017  Attending Physician: Zulema Rios MD    Discharge Diagnosis:   TIA  Uncontrolled HTN  Tobacco use    Consultants: Neurology    Imaging:  MRI Brain  Nonspecific, but fairly extensive white matter signal changes, especially given  the patient's age. This is seen in conjunction with mild generalized volume  loss. Although these signal changes are nonspecific, and could be explained by  small vessel ischemic disease changes especially in the setting of uncontrolled  hypertension. However given the perpendicular orientation of the T2 hyperintense  foci relative to the lateral ventricles, a demyelinating process should also be  considered in the differential diagnosis.     Carotids  Right:  Mild plaque noted in the right carotid system. Right ICA velocities suggest less than 50% diameter reduction. Right vertebral artery flow is antegrade. Left:  Mild plaque noted in the left carotid system. Left ICA velocities suggest less than 50% diameter reduction. Left vertebral artery flow is antegrade.     TTE  Left ventricle: Systolic function was normal. Ejection fraction was  estimated in the range of 55 % to 60 %. No obvious wall motion  abnormalities identified in the views obtained. Tricuspid valve: There was mild regurgitation. Brief Admission History/Reason for Admission Per Joshua Issa MD:   Leandro Infante is a 49 y.o.  male with history of hypertension who presents with 12 hour history of left arm and leg numbness. Symptoms started this AM as patient was getting out of the car to go to work. His entire forearm and leg felt numb, he thought his arm \"fell asleep\" however the symptoms did not resolve. Denies any weakness the numbness is now reduced to his finger tips and toes.  Denies any history of stroke and is not on any medications. Is seeing a physician currently who was planning to start antihypertensives.       Brief Hospital Course by Main Problems:   TIA with L upper and lower extremity numbness   Uncontrolled HTN  Patient with uncontrolled hypertension, does have evidence of old stroke on CT head. Aspirin 81 mg started, given aspirin 162 mg in ED. CVA labs include: A1C 5.3, LDL 77. MRI head, carotid doppler, Echo with result as above. Pt without hx of hypertension, however on arrival, BP was 157/104. Will start on low dose amlodipine 2.5mg. Recommending home monitoring BP with log for further titration of medication at PCP f/u visit. Cont' ASA, statin as prescribed. PT/OT did not rec HHC on discharge.     Tobacco use  Smoking cessation counseled.     Discharge Exam:  Patient seen and examined by me on discharge day. Pertinent Findings:  Visit Vitals    /76 (BP 1 Location: Right arm, BP Patient Position: Sitting)    Pulse 62    Temp 98.2 °F (36.8 °C)    Resp 16    Ht 5' 11\" (1.803 m)    Wt 68 kg (150 lb)    SpO2 99%    BMI 20.92 kg/m2     Gen:    Not in distress  Chest: Clear lungs  CVS:   Regular rhythm. No edema  Abd:  Soft, not distended, not tender    Discharge/Recent Laboratory Results:  Recent Labs      09/20/17   1352   NA  136   K  3.9   CL  104   CO2  25   BUN  7   GLU  89   CA  8.9     Recent Labs      09/20/17   1352   HGB  14.1   HCT  41.5   WBC  7.1   PLT  229       Discharge Medications:  Current Discharge Medication List      START taking these medications    Details   aspirin 81 mg chewable tablet Take 1 Tab by mouth daily. Qty: 30 Tab, Refills: 0      atorvastatin (LIPITOR) 10 mg tablet Take 1 Tab by mouth nightly. Qty: 30 Tab, Refills: 0      amLODIPine (NORVASC) 2.5 mg tablet Take 1 Tab by mouth daily.   Qty: 30 Tab, Refills: 0         CONTINUE these medications which have NOT CHANGED    Details   ibuprofen (MOTRIN) 200 mg tablet Take 400 mg by mouth every six (6) hours as needed for Pain. MULTIVIT-MINERALS/FA/LYCOPENE (ONE-A-DAY MEN'S MULTIVITAMIN PO) Take 1 Tab by mouth daily.              DISPOSITION:    Home with Family: x   Home with HH/PT/OT/RN:    SNF/LTC:    JACOB:    OTHER:          Follow up with:   PCP : Britney Madera MD  Follow-up Information     Follow up With Details Comments Inés ANNE NP On 10/26/2017 9:30am Neurology follow-up   Please arrive 20min early and bring a photo ID, insurance card and a list of medications [id number] [place] [id number]      Britney Madera MD On 10/3/2017 check TSH your appointment is at 4:45pm [address] [facility] [address] [phone]            Diet: harshil    Total time in minutes spent coordinating this discharge (includes going over instructions, follow-up, prescriptions, and preparing report for sign off to her PCP) :  35 minutes

## 2017-09-21 NOTE — PROGRESS NOTES
physical Therapy neuro EVALUATION/discharge     Patient: Deb Mcguire (49 y.o. male)  Date: 9/21/2017  Primary Diagnosis: Stroke (cerebrum) [facility])        Precautions:      CT and MRI are negative for acute CVA  ASSESSMENT :  Based on the objective data described below, the patient presents with good strength, good functional mobility, and steady gait following admission for stroke. PTA patient lives with his fiance. He is independent with all aspects of functional mobility and ADLs. 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 Currently, patient denies any weakness and with 5/5 strength B. Patient's only residual symptom is numbness in L distal fingers and toes. Patient is up ad dustin. Patient with steady gait and intact balance. He scored 52/56 on Perez balance test and able to retrieve objects from the ground independently without difficulty. Patient reports he is at his functional baseline. PT services are not indicated at discharge. Skilled physical therapy is not indicated at this time. PLAN :  Discharge Recommendations: None  Further Equipment Recommendations for Discharge: none       SUBJECTIVE:   Patient stated I feel fine, it is just the numbness still.     OBJECTIVE DATA SUMMARY:   HISTORY:    Past Medical History:   Diagnosis Date    Hypertension     Liver disease     Stroke (cerebrum) ([facility] [address] 9/20/2017     Past Surgical History:   Procedure Laterality Date    HX ORTHOPAEDIC Right 1998    Rt hip repair     Prior Level of Function/Home Situation:  patient lives with his fiance. He is independent with all aspects of functional mobility and ADLs. 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    Personal factors and/or comorbidities impacting plan of care:     Home Situation  Home Environment: Private residence  # Steps to Enter: 4  One/Two Story Residence: One story  Living Alone: No  Support Systems: Spouse/Significant Other/Partner, Family member(s)  Patient Expects to be Discharged to[de-identified] Private residence  Current DME Used/Available at Home: None    EXAMINATION/PRESENTATION/DECISION MAKING:   Critical Behavior:  Neurologic State: Alert  Orientation Level: Oriented X4  Cognition: Appropriate for age attention/concentration, Follows commands     Hearing: Auditory  Auditory Impairment: None  Skin:  intact  Edema:  none  Range Of Motion:  AROM: Within functional limits           PROM: Within functional limits           Strength:    Strength: Within functional limits                    Tone & Sensation:   Tone: Normal              Sensation: Impaired (distal tips of L fingers and toes)               Coordination:  Coordination: Within functional limits  Vision:      Functional Mobility:  Bed Mobility:  Rolling: Independent  Supine to Sit: Independent     Scooting: Independent  Transfers:  Sit to Stand: Independent  Stand to Sit: Independent        Bed to Chair: Independent              Balance:   Sitting: Intact; Without support  Standing: Intact; Without support  Ambulation/Gait Training:  Distance (ft): 30 Feet (ft)  Assistive Device: Gait belt  Ambulation - Level of Assistance: Independent     Gait Description (WDL): Exceptions to WDL                 Speed/Katherin: Accelerated                       Therapeutic Exercises:       Functional Measure:  Perez Balance Test:    Sitting to Standin  Standing Unsupported: 4  Sitting with Back Unsupported: 4  Standing to Sittin  Transfers: 4  Standing Unsupported with Eyes Closed: 4  Standing Unsupported with Feet Together: 4  Reach Forward with Outstretched Arm: 4   Object: 4  Turn to Look Over Shoulders: 4  Turn 360 Degrees: 4  Alternate Foot on Step/Stool: 4  Standing Unsupported One Foot in Front: 3  Stand on One Le  Total: 52         56=Maximum possible score;   0-20=High fall risk  21-40=Moderate fall risk   41-56=Low fall risk     Perez Balance Test and G-code impairment scale:  Percentage of Impairment CH    0%   CI    1-19% CJ    20-39% CK    40-59% CL    60-79% CM    80-99% CN     100%   Perez   Score 0-56 56 45-55 34-44 23-33 12-22 1-11 0     G codes: In compliance with CMSs Claims Based Outcome Reporting, the following G-code set was chosen for this patient based on their primary functional limitation being treated: The outcome measure chosen to determine the severity of the functional limitation was the Dooley with a score of 52/56 which was correlated with the impairment scale. ? Mobility - Walking and Moving Around:     - CURRENT STATUS: CI - 1%-19% impaired, limited or restricted    - GOAL STATUS: CI - 1%-19% impaired, limited or restricted    - D/C STATUS:  CI - 1%-19% impaired, limited or restricted        Physical Therapy Evaluation Charge Determination   History Examination Presentation Decision-Making   MEDIUM  Complexity : 1-2 comorbidities / personal factors will impact the outcome/ POC  MEDIUM Complexity : 3 Standardized tests and measures addressing body structure, function, activity limitation and / or participation in recreation  LOW Complexity : Stable, uncomplicated  Other outcome measures Perez  LOW       Based on the above components, the patient evaluation is determined to be of the following complexity level: LOW     Pain:Pain Scale 1: Numeric (0 - 10)  Pain Intensity 1: 0              Activity Tolerance:   Good, at baseline. Please refer to the flowsheet for vital signs taken during this treatment. After treatment:   [x]         Patient left in no apparent distress sitting up in chair  []         Patient left in no apparent distress in bed  [x]         Call bell left within reach  [x]         Nursing notified  []         Caregiver present  []         Bed alarm activated    COMMUNICATION/EDUCATION:   Patient only with numbness in L fingers and toes and educated on safety regarding numbness. Patient and/or family was verbally educated on the BE FAST acronym for signs/symptoms of CVA and TIA.  BE FAST was written on patient's communication board  for visual education and reinforcement. All questions answered with patient indicating good understanding. [x]   Fall prevention education was provided and the patient/caregiver indicated understanding. [x]   Patient/family have participated as able and agree with findings and recommendations. []   Patient is unable to participate in plan of care at this time.     Findings and recommendations were discussed with: Occupational Therapist, Registered Nurse and     Thank you for this referral.  Jones Morgan, PT, DPT   Time Calculation: 8 mins

## 2017-09-21 NOTE — CONSULTS
NEUROLOGY NOTE       DATE OF CONSULTATION: 9/21/2017    CONSULTED BY: Kaycee Cohn MD    Chief Complaint   Patient presents with    Numbness     per EMS pt reports tingling to L leg onset at 0630 this morning and now tingling is in L hand as well       Reason for Consult  I have been asked to see the patient in neurological consultation to render advice and opinion regarding left-sided numbness. HISTORY OF PRESENT ILLNESS  Curt Carbajal is a 49 y.o. male who presents to the hospital because of  left-sided numbness. The patient states that when he was going to work he noticed numbness in his left hand and feet which progressed over the next few hours and he became concerned and came to the hospital.    He did have MRI of the brain which did show possibility of multiple sclerosis. It did not show any acute stroke.     ROS  A ten system review of constitutional, cardiovascular, respiratory, musculoskeletal, endocrine, skin, SHEENT, genitourinary, psychiatric and neurologic systems was obtained and is unremarkable except as stated in HPI     PMH  Past Medical History:   Diagnosis Date    Hypertension     Liver disease     Stroke (cerebrum) ([facility] [address] 9/20/2017       FH  Family History   Problem Relation Age of Onset    Stroke Father     Kidney Disease Brother        SH  Social History     Social History    Marital status: SINGLE     Spouse name: N/A    Number of children: N/A    Years of education: N/A     Social History Main Topics    Smoking status: Current Every Day Smoker     Packs/day: 0.25    Smokeless tobacco: Never Used    Alcohol use No    Drug use: No    Sexual activity: Yes     Partners: Female     Other Topics Concern    None     Social History Narrative       ALLERGIES  No Known Allergies    PHYSICAL EXAM  EXAMINATION:   Patient Vitals for the past 24 hrs:   Temp Pulse Resp BP SpO2   09/21/17 0754 98.4 °F (36.9 °C) 66 16 125/86 100 %   09/21/17 0318 98.2 °F (36.8 °C) 64 16 140/79 99 % 09/20/17 2259 98.6 °F (37 °C) 83 16 (!) 141/93 100 %   09/20/17 1959 97.6 °F (36.4 °C) 62 16 (!) 151/92 99 %   09/20/17 1700 - - - (!) 151/100 97 %   09/20/17 1530 - - - 138/87 97 %   09/20/17 1515 - - - (!) 142/99 97 %   09/20/17 1500 - - - (!) 155/94 97 %   09/20/17 1333 98.2 °F (36.8 °C) 70 18 (!) 157/104 100 %        General:   General appearance: Pt is in no acute distress   Distal pulses are preserved  Fundoscopic exam: attempted    Neurological Examination:   Mental Status:  AAO x3. Speech is fluent. Follows commands, has normal fund of knowledge, attention, short term recall, comprehension and insight. Cranial Nerves: Visual fields are full. PERRL, Extraocular movements are full. Facial sensation intact V1- V3. Facial movement intact, symmetric. Hearing intact to conversation. Palate elevates symmetrically. Shoulder shrug symmetric. Tongue midline. Motor: Strength is 5/5 in all 4 ext. Normal tone. No atrophy. Sensation: Normal to light touch    Reflexes: DTRs 2+ throughout. Plantar responses downgoing. Coordination/Cerebellar: Intact to finger-nose-finger     Gait: Deferred    Skin: No significant bruising or lacerations.     LAB DATA REVIEWED:    Recent Results (from the past 24 hour(s))   EKG, 12 LEAD, INITIAL    Collection Time: 09/20/17  1:38 PM   Result Value Ref Range    Ventricular Rate 67 BPM    Atrial Rate 67 BPM    P-R Interval 166 ms    QRS Duration 92 ms    Q-T Interval 394 ms    QTC Calculation (Bezet) 416 ms    Calculated P Axis 47 degrees    Calculated R Axis 68 degrees    Calculated T Axis 51 degrees    Diagnosis       Normal sinus rhythm  Minimal voltage criteria for LVH, may be normal variant  Minor nonspecific st&t changes    No previous ECGs available  Confirmed by Constance Ortiz ([id number]) on 9/20/2017 5:31:05 PM     CBC WITH AUTOMATED DIFF    Collection Time: 09/20/17  1:52 PM   Result Value Ref Range    WBC 7.1 4.1 - 11.1 K/uL    RBC 4.36 4.10 - 5.70 M/uL    HGB 14.1 12.1 - 17.0 g/dL    HCT 41.5 36.6 - 50.3 %    MCV 95.2 80.0 - 99.0 FL    MCH 32.3 26.0 - 34.0 PG    MCHC 34.0 30.0 - 36.5 g/dL    RDW 12.4 11.5 - 14.5 %    PLATELET 883 128 - 231 K/uL    NEUTROPHILS 43 32 - 75 %    LYMPHOCYTES 47 12 - 49 %    MONOCYTES 9 5 - 13 %    EOSINOPHILS 1 0 - 7 %    BASOPHILS 0 0 - 1 %    ABS. NEUTROPHILS 3.0 1.8 - 8.0 K/UL    ABS. LYMPHOCYTES 3.3 0.8 - 3.5 K/UL    ABS. MONOCYTES 0.7 0.0 - 1.0 K/UL    ABS. EOSINOPHILS 0.1 0.0 - 0.4 K/UL    ABS. BASOPHILS 0.0 0.0 - 0.1 K/UL   METABOLIC PANEL, COMPREHENSIVE    Collection Time: 09/20/17  1:52 PM   Result Value Ref Range    Sodium 136 136 - 145 mmol/L    Potassium 3.9 3.5 - 5.1 mmol/L    Chloride 104 97 - 108 mmol/L    CO2 25 21 - 32 mmol/L    Anion gap 7 5 - 15 mmol/L    Glucose 89 65 - 100 mg/dL    BUN 7 6 - 20 MG/DL    Creatinine 0.68 (L) 0.70 - 1.30 MG/DL    BUN/Creatinine ratio 10 (L) 12 - 20      GFR est AA >60 >60 ml/min/1.73m2    GFR est non-AA >60 >60 ml/min/1.73m2    Calcium 8.9 8.5 - 10.1 MG/DL    Bilirubin, total 0.3 0.2 - 1.0 MG/DL    ALT (SGPT) 18 12 - 78 U/L    AST (SGOT) 17 15 - 37 U/L    Alk.  phosphatase 59 45 - 117 U/L    Protein, total 8.8 (H) 6.4 - 8.2 g/dL    Albumin 3.9 3.5 - 5.0 g/dL    Globulin 4.9 (H) 2.0 - 4.0 g/dL    A-G Ratio 0.8 (L) 1.1 - 2.2     TROPONIN I    Collection Time: 09/20/17  1:52 PM   Result Value Ref Range    Troponin-I, Qt. <0.04 <0.05 ng/mL   URINALYSIS W/ REFLEX CULTURE    Collection Time: 09/20/17  2:02 PM   Result Value Ref Range    Color YELLOW/STRAW      Appearance CLEAR CLEAR      Specific gravity 1.013 1.003 - 1.030      pH (UA) 7.5 5.0 - 8.0      Protein NEGATIVE  NEG mg/dL    Glucose NEGATIVE  NEG mg/dL    Ketone NEGATIVE  NEG mg/dL    Bilirubin NEGATIVE  NEG      Blood NEGATIVE  NEG      Urobilinogen 0.2 0.2 - 1.0 EU/dL    Nitrites NEGATIVE  NEG      Leukocyte Esterase NEGATIVE  NEG      WBC 0-4 0 - 4 /hpf    RBC 0-5 0 - 5 /hpf    Epithelial cells FEW FEW /lpf    Bacteria NEGATIVE  NEG /hpf    UA:UC IF INDICATED CULTURE NOT INDICATED BY UA RESULT CNI     PROTHROMBIN TIME + INR    Collection Time: 09/20/17  3:31 PM   Result Value Ref Range    INR 1.0 0.9 - 1.1      Prothrombin time 10.3 9.0 - 11.1 sec   LIPID PANEL    Collection Time: 09/21/17  3:19 AM   Result Value Ref Range    LIPID PROFILE          Cholesterol, total 146 <200 MG/DL    Triglyceride 125 <150 MG/DL    HDL Cholesterol 44 MG/DL    LDL, calculated 77 0 - 100 MG/DL    VLDL, calculated 25 MG/DL    CHOL/HDL Ratio 3.3 0 - 5.0     HEMOGLOBIN A1C WITH EAG    Collection Time: 09/21/17  3:19 AM   Result Value Ref Range    Hemoglobin A1c 5.4 4.2 - 6.3 %    Est. average glucose 108 mg/dL        Imaging review:  See below. Stroke workup    MRI Brain  Nonspecific, but fairly extensive white matter signal changes, especially given  the patient's age. This is seen in conjunction with mild generalized volume  loss. Although these signal changes are nonspecific, and could be explained by  small vessel ischemic disease changes especially in the setting of uncontrolled  hypertension. However given the perpendicular orientation of the T2 hyperintense  foci relative to the lateral ventricles, a demyelinating process should also be  considered in the differential diagnosis. Carotids:   Right:  Mild plaque noted in the right carotid system. Right ICA velocities suggest less than 50% diameter reduction. Right vertebral artery flow is antegrade. Left:  Mild plaque noted in the left carotid system. Left ICA velocities suggest less than 50% diameter reduction. Left vertebral artery flow is antegrade. TTE:   Pending    Stroke labs:  HgBA1c    Lab Results   Component Value Date/Time    Hemoglobin A1c 5.4 09/21/2017 03:19 AM     LDL   Lab Results   Component Value Date/Time    LDL, calculated 77 09/21/2017 03:19 AM       HOME MEDS  Prior to Admission Medications   Prescriptions Last Dose Informant Patient Reported? Taking?    MULTIVIT-MINERALS/FA/LYCOPENE (ONE-A-DAY MEN'S MULTIVITAMIN PO) 9/17/2017 at Unknown time Self Yes Yes   Sig: Take 1 Tab by mouth daily. ibuprofen (MOTRIN) 200 mg tablet 9/18/2017 at Unknown time Self Yes Yes   Sig: Take 400 mg by mouth every six (6) hours as needed for Pain. Facility-Administered Medications: None       CURRENT MEDS  Current Facility-Administered Medications   Medication Dose Route Frequency    sodium chloride (NS) flush 5-10 mL  5-10 mL IntraVENous Q8H    aspirin chewable tablet 81 mg  81 mg Oral DAILY       IMPRESSION:  Evan Mendoza is a 49 y.o. male who presents with new onset left-sided numbness. MRI brain did show fairly extensive white matter changes. It did not show any acute stroke. This is possibly TIA versus demyelinating etiology. I did discuss with him that we can do lumbar puncture and send for multiple sclerosis panel but he does not want to do that. I am going to follow the patient in the office and will repeat the MRI in 6 months to a year to see if he is developing more lesions. The meanwhile we will continue him on aspirin 81 mg a day and will start him on atorvastatin 20 mg daily. RECOMMENDATIONS:  - MRI brain w/o C -extensive white matter changes  - Carotid US  -no significant stenosis  - TTE - Pending  - Telemetry  -BP goal is less than 140/90  - Stroke labs (HgbA1c, TSH, lipid panel)  - Continue ASA 81 mg daily  - Start atorvastatin 10 mg daily as LDL more than 70.  - ST/OT/PT eval    No further neurological workup after transthoracic echo. Okay to be discharged after that and will see him back in my office in 2-4 weeks. Thank you very much for this consultation. We will follow up on the above studies and give further recommendations as indicated.       Aida Flores MD  Neurologist

## 2017-09-21 NOTE — PROGRESS NOTES
Bedside and Verbal shift change report given to Sugar Nelson (oncoming nurse) by Fred Franklin (offgoing nurse). Report included the following information SBAR, Kardex, ED Summary, Intake/Output, MAR, Recent Results and Cardiac Rhythm NSR.

## 2017-09-21 NOTE — PROGRESS NOTES
Problem: Self Care Deficits Care Plan (Adult)  Goal: *Acute Goals and Plan of Care (Insert Text)  OCCUPATIONAL THERAPY EVALUATION/DISCHARGE  Patient: Angelo Grimes (49 y.o. male)  Date: 9/21/2017  Primary Diagnosis: Stroke (cerebrum) [facility])        Precautions: standard         ASSESSMENT:   Based on the objective data described below, the patient presents with negative medical testing for stroke. Pt is generally functioning at his independent baseline level and demonstrates decreased ROM R hip-impaired external rotation at baseline due to old surgery . He has  numbness in LUE (non dominant) and LLE has improved and is now only in his L fingertips and L toes. At this time, pt's numbness does not impair basic adls. Pt was educated on home safety and fall prevention and protecting his B hands at work (he generally wears gloves.)  Educated pt on a variety of sensory activities to help to normalize sensation as a home program of activities. Educated pt on specifics of safety during adls and in his workplace due to his impaired sensation. Encouraged pt to follow up with his neurologist and to call asap if symptoms change. Pt verbalized understanding. .  Further skilled acute occupational therapy is not indicated at this time. Discharge Recommendations: None  Further Equipment Recommendations for Discharge: none        SUBJECTIVE:   Patient stated  It was in my arm and from my knee down.   (numbness and tingling)      OBJECTIVE DATA SUMMARY:   HISTORY:   Past Medical History:   Diagnosis Date    Hypertension      Liver disease      Stroke (cerebrum) ([facility] [address] 9/20/2017     Past Surgical History:   Procedure Laterality Date    HX ORTHOPAEDIC Right 1998     Rt hip repair        Prior Level of Function/Home Situation: Pt reports independent in adls and IADLs at baseline. He has decreased R hip external rotation due to hip sx approx 20 yrs ago.   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 Expanded or extensive additional review of patient history:      Home Situation  Home Environment: Private residence  # Steps to Enter: 4  One/Two Story Residence: One story  Living Alone: No  Support Systems: Spouse/Significant Other/Partner, Family member(s)  Patient Expects to be Discharged to[de-identified] Private residence  Current DME Used/Available at Home: None  Tub or Shower Type: Tub/Shower combination  [X]  Right hand dominant             [ ]  Left hand dominant     EXAMINATION OF PERFORMANCE DEFICITS:  Cognitive/Behavioral Status:  Neurologic State: Alert  Orientation Level: Oriented X4  Cognition: Appropriate decision making; Follows commands  Perception: Appears intact  Perseveration: No perseveration noted  Safety/Judgement: Awareness of environment; Fall prevention;Home safety; Insight into deficits     Skin: generally intact     Edema: none observed     Hearing: Auditory  Auditory Impairment: None     Vision/Perceptual:    Tracking: Able to track stimulus in all quadrants w/o difficulty                      Acuity:  (reports no vision concerns)          Range of Motion:  BUEs:  AROM: Within functional limits  PROM: Within functional limits                       Strength:  BUEs:    Strength: Within functional limits    strength is equal and good bilaterally                 Coordination:  Coordination: Within functional limits  Fine Motor Skills-Upper: Left Intact; Right Intact (despite numbness reported L fingertips)    Gross Motor Skills-Upper: Left Intact; Right Intact     Tone & Sensation:     Tone: Normal  Sensation: Impaired (distal tips of L fingers and toes) Educated on safety. Performed knee to nose test with good result. Decreased sensation does not impair function during basic adls.                        Balance:  Sitting: Intact; With support  Sits with R hip externally rotated-encouraged pt to sit with RLE in midline position. Standing: Intact; Without support (able to  object from floor at baseline level)      Functional Mobility and Transfers for ADLs:  Bed Mobility:  Rolling:  (pt seated at window seat upon arrival)  Supine to Sit: Independent  Scooting: Independent     Transfers:  Sit to Stand: Independent  Stand to Sit: Independent  Bed to Chair: Independent  Toilet Transfer : Independent     ADL Assessment:  Feeding: Independent     Oral Facial Hygiene/Grooming: Independent     Bathing: Independent     Upper Body Dressing: Independent     Lower Body Dressing: Independent     Toileting: Independent                 ADL Intervention and task modifications:     Pt performed simulated IADLs with independence demonstrating good balance, ROM and strength. Pt's only concern is his Lfingertip numbness and L toes numbness. Educated pt on home safety, work safety, fall prevention and proper footwear. Pt verbalized understanding. Educated pt on home program of variety of sensory activities. Friction hand to hand, friction using lotion, gentle tapping with finger tips,  Gentle pressure into fingertips (as demonstrted) applying a variety of sensory activities-smooth, rough, light pressure/deep pressure, warm/cool as part of home program                                       Cognitive Retraining  Safety/Judgement: Awareness of environment; Fall prevention;Home safety; Insight into deficits     Therapeutic Exercise:  Educated pt on basic hand exercise program    Functional Measure:  Barthel Index:      Bathin  Bladder: 10  Bowels: 10  Groomin  Dressing: 10  Feeding: 10  Mobility: 15  Stairs: 10  Toilet Use: 10  Transfer (Bed to Chair and Back): 15  Total: 100         Barthel and G-code impairment scale:  Percentage of impairment CH  0% CI  1-19% CJ  20-39% CK  40-59% CL  60-79% CM  80-99% CN  100%   Barthel Score 0-100 100 99-80 79-60 59-40 20-39 1-19    0   Barthel Score 0-20 20 17-19 13-16 9-12 5-8 1-4 0      The Barthel ADL Index: Guidelines  1. The index should be used as a record of what a patient does, not as a record of what a patient could do. 2. The main aim is to establish degree of independence from any help, physical or verbal, however minor and for whatever reason. 3. The need for supervision renders the patient not independent. 4. A patient's performance should be established using the best available evidence. Asking the patient, friends/relatives and nurses are the usual sources, but direct observation and common sense are also important. However direct testing is not needed. 5. Usually the patient's performance over the preceding 24-48 hours is important, but occasionally longer periods will be relevant. 6. Middle categories imply that the patient supplies over 50 per cent of the effort. 7. Use of aids to be independent is allowed. [facility]., Barthel, DMamtaW. ([id number]). Functional evaluation: the Barthel Index. [address] [facility] (14)2. Anuja Person uli ADOLFO Person, [place]., Starlene Human., [facility], [address] [facility] (1999). Measuring the change indisability after inpatient rehabilitation; comparison of the responsiveness of the Barthel Index and Functional Thomas Measure. Journal of Neurology, Neurosurgery, and Psychiatry, 66(4), 842-706. Juan Ling, N.J.A, MARY KAY King.AVLA, & Elizabeth Varma M.A. (2004.) Assessment of post-stroke quality of life in cost-effectiveness studies: The usefulness of the Barthel Index and the EuroQoL-5D. Quality of Life Research, 13, 386-21         G codes: In compliance with CMSs Claims Based Outcome Reporting, the following G-code set was chosen for this patient based on their primary functional limitation being treated:      The outcome measure chosen to determine the severity of the functional limitation was the BArthel Index with a score of 100/100 which was correlated with the impairment scale. · Self Care:               - CURRENT STATUS:    CH - 0% impaired, limited or restricted               - GOAL STATUS:           CH - 0% impaired, limited or restricted               - D/C STATUS:                       CH - 0% impaired, limited or restricted      Occupational Therapy Evaluation Charge Determination   History Examination Decision-Making   LOW Complexity : Brief history review  MEDIUM Complexity : 3-5 performance deficits relating to physical, cognitive , or psychosocial skils that result in activity limitations and / or participation restrictions MEDIUM Complexity : Patient may present with comorbidities that affect occupational performnce. Miniml to moderate modification of tasks or assistance (eg, physical or verbal ) with assesment(s) is necessary to enable patient to complete evaluation       Based on the above components, the patient evaluation is determined to be of the following complexity level: LOW   Pain:  Pain Scale 1: Numeric (0 - 10)  Pain Intensity 1: 0              Activity Tolerance:   Good. No complaints  After treatment:   [X]  Patient left in no apparent distress sitting up in chair  [ ]  Patient left in no apparent distress in bed  [X]  Call bell left within reach  [X]  Nursing notified  [X]  Caregiver present  [ ]  Bed alarm activated      COMMUNICATION/EDUCATION:   Communication/Collaboration:  [X]      Home safety education was provided and the patient/caregiver indicated understanding. [X]      Patient/family have participated as able and agree with findings and recommendations. [ ]      Patient is unable to participate in plan of care at this time.   Findings and recommendations were discussed with: Physical Therapist and Registered Nurse     Uvaldo Squires OTR/L  Time Calculation: 23 mins

## 2017-09-21 NOTE — PROGRESS NOTES
Left message with PCP requesting a f/u.   Waiting on a call back    A Neuro f/u is scheduled with Lion Horta NP for Oct 26 at 9;30am

## 2017-09-21 NOTE — PROCEDURES
[facility]  *** FINAL REPORT ***    Name: Angeli Sanchez  MRN: [id number]    Inpatient  : 1965  HIS Order #: [id number]  [id number] [facility] Visit #: [id number]  Date: 21 Sep 2017    TYPE OF TEST: Cerebrovascular Duplex    REASON FOR TEST  Stroke    Right Carotid:-             Proximal               Mid                 Distal  cm/s  Systolic  Diastolic  Systolic  Diastolic  Systolic  Diastolic  CCA:    731.0      24.0                            96.0      26.0  Bulb:  ECA:     74.0      12.0  ICA:     88.0      26.0       96.0      35.0       78.0      31.0  ICA/CCA:  0.9       1.0    ICA Stenosis: <50%    Right Vertebral:-  Finding: Antegrade  Sys:       56.0  Yanna:       17.0    Right Subclavian: Normal    Left Carotid:-            Proximal                Mid                 Distal  cm/s  Systolic  Diastolic  Systolic  Diastolic  Systolic  Diastolic  CCA:    257.3      25.0                            90.0      27.0  Bulb:  ECA:     86.0      16.0  ICA:     66.0      18.0       81.0      27.0       77.0      28.0  ICA/CCA:  0.7       0.7    ICA Stenosis: <50%    Left Vertebral:-  Finding: Antegrade  Sys:       70.0  Yanna:       21.0    Left Subclavian: Normal    INTERPRETATION/FINDINGS  PROCEDURE:  Carotid Duplex Examination using B-mode, color and  spectral Doppler of the extracranial cerebrovascular arteries. 1. Bilateral <50% stenosis of the internal carotid arteries. 2. No significant stenosis in the external carotid arteries  bilaterally. 3. Antegrade flow in both vertebral arteries. 4. Normal flow in both subclavian arteries. Plaque Morphology:  1. Calcific plaque in the bulb and right ICA. 2. Calcific plaque in the bulb and left ICA. ADDITIONAL COMMENTS    I have personally reviewed the data relevant to the interpretation of  this  study. TECHNOLOGIST: Norman Love RVT  Signed: 2017 09:43 AM    PHYSICIAN: Braden Galarza MD  Signed: 2017 03:05 PM

## 2017-09-21 NOTE — PROGRESS NOTES
[id number] [facility]  Preliminary Report:  Carotid Duplex Scan    Right:  Mild plaque noted in the right carotid system. Right ICA velocities suggest less than 50% diameter reduction. Right vertebral artery flow is antegrade. Left:  Mild plaque noted in the left carotid system. Left ICA velocities suggest less than 50% diameter reduction. Left vertebral artery flow is antegrade. Final report to follow.

## 2017-10-26 ENCOUNTER — OFFICE VISIT (OUTPATIENT)
Dept: NEUROLOGY | Age: 52
End: 2017-10-26

## 2017-10-26 VITALS
WEIGHT: 156 LBS | HEART RATE: 70 BPM | HEIGHT: 71 IN | DIASTOLIC BLOOD PRESSURE: 84 MMHG | OXYGEN SATURATION: 98 % | SYSTOLIC BLOOD PRESSURE: 122 MMHG | BODY MASS INDEX: 21.84 KG/M2

## 2017-10-26 DIAGNOSIS — I67.89 CEREBRAL MICROVASCULAR DISEASE: ICD-10-CM

## 2017-10-26 DIAGNOSIS — R20.0 LEFT SIDED NUMBNESS: Primary | ICD-10-CM

## 2017-10-26 DIAGNOSIS — I10 ESSENTIAL HYPERTENSION: ICD-10-CM

## 2017-10-26 DIAGNOSIS — G37.9 DEMYELINATING DISEASE (HCC): ICD-10-CM

## 2017-10-26 NOTE — MR AVS SNAPSHOT
Visit Information Date & Time Provider Department Dept. Phone Encounter #  
 10/26/2017  9:30 AM Olivia Aponte NP [facility] [phone] [id number] Upcoming Health Maintenance Date Due Pneumococcal 19-64 Medium Risk (1 of 1 - PPSV23) 11/8/1985 DTaP/Tdap/Td series (1 - Tdap) 11/8/1987 FOBT Q 1 YEAR AGE 50-75 11/8/2016 INFLUENZA AGE 9 TO ADULT 8/1/2017 Allergies as of 10/26/2017  Review Complete On: 10/26/2017 By: Magdaleno Loya No Known Allergies Current Immunizations  Never Reviewed No immunizations on file. Not reviewed this visit You Were Diagnosed With   
  
 Codes Comments Left sided numbness    -  Primary ICD-10-CM: R20.0 ICD-9-CM: 782.0 Demyelinating disease ([facility] [address]     ICD-10-CM: G37.9 ICD-9-CM: 341. 9 Vitals BP Pulse Height(growth percentile) Weight(growth percentile) SpO2 BMI  
 122/84 70 5' 11\" (1.803 m) 156 lb (70.8 kg) 98% 21.76 kg/m2 Smoking Status Current Every Day Smoker Vitals History BMI and BSA Data Body Mass Index Body Surface Area 21.76 kg/m 2 1.88 m 2 Preferred Pharmacy Pharmacy Name Phone [id number] [place] [phone] Your Updated Medication List  
  
   
This list is accurate as of: 10/26/17  9:59 AM.  Always use your most recent med list. amLODIPine 2.5 mg tablet Commonly known as:  Ana Pace Take 1 Tab by mouth daily. aspirin 81 mg chewable tablet Take 1 Tab by mouth daily. atorvastatin 10 mg tablet Commonly known as:  LIPITOR Take 1 Tab by mouth nightly. ibuprofen 200 mg tablet Commonly known as:  MOTRIN Take 400 mg by mouth every six (6) hours as needed for Pain. ONE-A-DAY MEN'S MULTIVITAMIN PO Take 1 Tab by mouth daily. To-Do List   
 03/20/2018 Imaging:  MRI BRAIN W WO CONT Referral Information Referral ID Referred By Referred To [id number] [employer] [facility] Not Available Visits Status Start Date End Date 1 New Request 10/26/17 10/26/18 If your referral has a status of pending review or denied, additional information will be sent to support the outcome of this decision. Patient Instructions Please schedule your MRI to be done at least a week before you see Dr. Vince Schmid, have the test done sometime between March 15 and April 2nd. PRESCRIPTION REFILL POLICY [facility] Statement to Patients April 1, 2014 In an effort to ensure the large volume of patient prescription refills is processed in the most efficient and expeditious manner, we are asking our patients to assist us by calling your Pharmacy for all prescription refills, this will include also your  Mail Order Pharmacy. The pharmacy will contact our office electronically to continue the refill process. Please do not wait until the last minute to call your pharmacy. We need at least 48 hours (2days) to fill prescriptions. We also encourage you to call your pharmacy before going to  your prescription to make sure it is ready. With regard to controlled substance prescription refill requests (narcotic refills) that need to be picked up at our office, we ask your cooperation by providing us with at least 72 hours (3days) notice that you will need a refill. We will not refill narcotic prescription refill requests after 4:00pm on any weekday, Monday through Thursday, or after 2:00pm on Fridays, or on the weekends. We encourage everyone to explore another way of getting your prescription refill request processed using [employer], our patient web portal through our electronic medical record system.  Z[employer] is an efficient and effective way to communicate your medication request directly to the office and downloadable as an missael on your smart phone . [employer] also features a review functionality that allows you to view your medication list as well as leave messages for your physician. Are you ready to get connected? If so please review the attatched instructions or speak to any of our staff to get you set up right away! Thank you so much for your cooperation. Should you have any questions please contact our Practice Administrator. The Physicians and Staff,  [facility] A Healthy Lifestyle: Care Instructions Your Care Instructions A healthy lifestyle can help you feel good, stay at a healthy weight, and have plenty of energy for both work and play. A healthy lifestyle is something you can share with your whole family. A healthy lifestyle also can lower your risk for serious health problems, such as high blood pressure, heart disease, and diabetes. You can follow a few steps listed below to improve your health and the health of your family. Follow-up care is a key part of your treatment and safety. Be sure to make and go to all appointments, and call your doctor if you are having problems. It's also a good idea to know your test results and keep a list of the medicines you take. How can you care for yourself at home? · Do not eat too much sugar, fat, or fast foods. You can still have dessert and treats now and then. The goal is moderation. · Start small to improve your eating habits. Pay attention to portion sizes, drink less juice and soda pop, and eat more fruits and vegetables. ¨ Eat a healthy amount of food. A 3-ounce serving of meat, for example, is about the size of a deck of cards. Fill the rest of your plate with vegetables and whole grains. ¨ Limit the amount of soda and sports drinks you have every day. Drink more water when you are thirsty. ¨ Eat at least 5 servings of fruits and vegetables every day.  It may seem like a lot, but it is not hard to reach this goal. A serving or helping is 1 piece of fruit, 1 cup of vegetables, or 2 cups of leafy, raw vegetables. Have an apple or some carrot sticks as an afternoon snack instead of a candy bar. Try to have fruits and/or vegetables at every meal. 
· Make exercise part of your daily routine. You may want to start with simple activities, such as walking, bicycling, or slow swimming. Try to be active 30 to 60 minutes every day. You do not need to do all 30 to 60 minutes all at once. For example, you can exercise 3 times a day for 10 or 20 minutes. Moderate exercise is safe for most people, but it is always a good idea to talk to your doctor before starting an exercise program. 
· Keep moving. [place] Bun the lawn, work in the garden, or 1SDK. Take the stairs instead of the elevator at work. · If you smoke, quit. People who smoke have an increased risk for heart attack, stroke, cancer, and other lung illnesses. Quitting is hard, but there are ways to boost your chance of quitting tobacco for good. ¨ Use nicotine gum, patches, or lozenges. ¨ Ask your doctor about stop-smoking programs and medicines. ¨ Keep trying. In addition to reducing your risk of diseases in the future, you will notice some benefits soon after you stop using tobacco. If you have shortness of breath or asthma symptoms, they will likely get better within a few weeks after you quit. · Limit how much alcohol you drink. Moderate amounts of alcohol (up to 2 drinks a day for men, 1 drink a day for women) are okay. But drinking too much can lead to liver problems, high blood pressure, and other health problems. Family health If you have a family, there are many things you can do together to improve your health. · Eat meals together as a family as often as possible. · Eat healthy foods. This includes fruits, vegetables, lean meats and dairy, and whole grains. · Include your family in your fitness plan. Most people think of activities such as jogging or tennis as the way to fitness, but there are many ways you and your family can be more active. Anything that makes you breathe hard and gets your heart pumping is exercise. Here are some tips: 
¨ Walk to do errands or to take your child to school or the bus. ¨ Go for a family bike ride after dinner instead of watching TV. Where can you learn more? Go to http://phil-beverly.info/. Enter T675 in the search box to learn more about \"A Healthy Lifestyle: Care Instructions. \" Current as of: May 12, 2017 Content Version: 11.4 © 3418-5640 [employer]. Care instructions adapted under license by [employer] (which disclaims liability or warranty for this information). If you have questions about a medical condition or this instruction, always ask your healthcare professional. [address] any warranty or liability for your use of this information. Introducing [facility]! Aniket Michel introduces [employer] patient portal. Now you can access parts of your medical record, email your doctor's office, and request medication refills online. 1. In your internet browser, go to https://Ischemia Care. [employer]/Ischemia Care 2. Click on the First Time User? Click Here link in the Sign In box. You will see the New Member Sign Up page. 3. Enter your [employer] Access Code exactly as it appears below. You will not need to use this code after youve completed the sign-up process. If you do not sign up before the expiration date, you must request a new code. · [employer] Access Code: [email or web address] Expires: 12/19/2017  2:12 PM 
 
4. Enter the last four digits of your Social Security Number (xxxx) and Date of Birth (mm/dd/yyyy) as indicated and click Submit. You will be taken to the next sign-up page. 5. Create a [employer] ID. This will be your [employer] login ID and cannot be changed, so think of one that is secure and easy to remember. 6. Create a [employer] password. You can change your password at any time. 7. Enter your Password Reset Question and Answer. This can be used at a later time if you forget your password. 8. Enter your e-mail address. You will receive e-mail notification when new information is available in [address] 9. Click Sign Up. You can now view and download portions of your medical record. 10. Click the Download Summary menu link to download a portable copy of your medical information. If you have questions, please visit the Frequently Asked Questions section of the [employer] website. Remember, [employer] is NOT to be used for urgent needs. For medical emergencies, dial 911. Now available from your iPhone and Android! Please provide this summary of care documentation to your next provider. Your primary care clinician is listed as Ailyn Aguilar. If you have any questions after today's visit, please call [phone].

## 2017-10-26 NOTE — LETTER
10/26/2017 10:13 AM 
 
Patient: Jose Vidal YOB: 1965 Date of Visit: 10/26/2017 Dear Stefani Padron MD 
[address] [facility] [phone] VIA In Basket 
 : 
 
 
Mr. Jose Vidal was seen in hospitalization for follow-up related to transient left-sided numbness. Below are the relevant portions of my assessment and plan of care. If you have questions, please do not hesitate to call me. I look forward to following Mr. Dory Zuniga along with you. Sincerely, Matt Doll NP Date:            2017 Name:  Dillon Maurice 
:  1965 MRN:  [id number] PCP:  Stefani Padron MD 
 
Chief Complaint Patient presents with  
[address] [facility] Follow Up HISTORY OF PRESENT ILLNESS: 
Jose Vidal is a 49 y.o., male who presents today for follow up for hospital follow-up. He was admitted for left-sided numbness and tingling. This lasted for a few days, his finger tips were still tingling in the hospital but he has not had any numbness or tingling since discharge. MRI showed heavy burden of nonspecific white matter changes and mild generalized volume loss, concerning for possible multiple sclerosis. Patient declined to have LP done for further workup while admitted, is still not interested. No history of focal neuro complaints, no significant visual changes now or ever. He saw his eye doctor recently for some blurred vision, this is been corrected with reading glasses. He has had few episodes of muscle spasms in the past that might last 3-5 minutes. He does complain of some bladder urgency, but thinks that he only notices this when he drinks coffee or tea not with soda or water. He has a h/o of hypertension, reports that it's well controlled now but he does not check it at home. No diabetes, mildly elevated LDL in the hospital so he was discharged on atorvastatin. PCP has not continued this medication.  
 
MRI Results (most recent): 
 
 Results from Hospital Encounter encounter on 09/20/17 MRI BRAIN WO CONT Narrative PRELIMINARY REPORT No acute intracranial abnormality. Nonspecific white matter disease. Preliminary report was provided by Dr. Barrie Turcios, the on-call radiologist, at 1946 
hours. Final report to follow. END PRELIMINARY REPORT Study: MRI BRAIN WO CONT Indication:  stroke Additional clinical history: 
 
Comparison: Head CT 9/20/2017. Contrast:  None administered. Technique: The following sequences were obtained: Axial T1, T2, T2 FLAIR, 
gradient echo; sagittal T1;   Coronal T2; diffusion-weighted imaging. Findings: 
Mild generalized volume loss is noted. The ventricles and sulci are otherwise 
normal in appearance. There is no mass effect or midline shift. There is no 
intracranial hemorrhage. Fairly extensive T2 hyperintense periventricular white 
matter signal changes are noted for the patient's age. In addition, many of 
these foci are oriented perpendicular to the lateral ventricles. There is 
evidence of prior lacunar infarct in the left basal ganglia. The basilar 
cisterns are patent. Diffusion-weighted imaging demonstrates no evidence of diffusion restriction. There are normal appearing vascular flow voids. The orbits are intact. Normal signal is seen in the paranasal sinuses and 
mastoid air cells. Impression Impression: 
Nonspecific, but fairly extensive white matter signal changes, especially given 
the patient's age. This is seen in conjunction with mild generalized volume 
loss. Although these signal changes are nonspecific, and could be explained by 
small vessel ischemic disease changes especially in the setting of uncontrolled 
hypertension. However given the perpendicular orientation of the T2 hyperintense 
foci relative to the lateral ventricles, a demyelinating process should also be 
considered in the differential diagnosis. Carotids Right: Mild plaque noted in the right carotid system. Right ICA velocities suggest less than 50% diameter reduction. Right vertebral artery flow is antegrade. Left: 
Mild plaque noted in the left carotid system. Left ICA velocities suggest less than 50% diameter reduction. Left vertebral artery flow is antegrade. 
   
TTE Left ventricle: Systolic function was normal. Ejection fraction was 
estimated in the range of 55 % to 60 %. No obvious wall motion 
abnormalities identified in the views obtained. Tricuspid valve: There was mild regurgitation. LDL 77 A1c 5.4 /104 
 
9.20.2017 opal Burks is a 49 y.o. male who presents to the hospital because of  left-sided numbness. The patient states that when he was going to work he noticed numbness in his left hand and feet which progressed over the next few hours and he became concerned and came to the hospital. 
  
He did have MRI of the brain which did show possibility of multiple sclerosis. It did not show any acute stroke. Current Outpatient Prescriptions Medication Sig  
 aspirin 81 mg chewable tablet Take 1 Tab by mouth daily.  atorvastatin (LIPITOR) 10 mg tablet Take 1 Tab by mouth nightly.  amLODIPine (NORVASC) 2.5 mg tablet Take 1 Tab by mouth daily.  ibuprofen (MOTRIN) 200 mg tablet Take 400 mg by mouth every six (6) hours as needed for Pain.  MULTIVIT-MINERALS/FA/LYCOPENE (ONE-A-DAY MEN'S MULTIVITAMIN PO) Take 1 Tab by mouth daily. No current facility-administered medications for this visit. No Known Allergies Past Medical History:  
Diagnosis Date  Hypertension  Liver disease  Stroke (cerebrum) ([facility] [address] 9/20/2017 Past Surgical History:  
Procedure Laterality Date V [address] 267 Rt hip repair Social History Social History  Marital status: SINGLE Spouse name: N/A  
 Number of children: N/A  
 Years of education: N/A Occupational History  Not on file. Social History Main Topics  Smoking status: Current Every Day Smoker Packs/day: 0.25  Smokeless tobacco: Never Used  Alcohol use No  
 Drug use: No  
 Sexual activity: Yes  
  Partners: Female Other Topics Concern  Not on file Social History Narrative Family History Problem Relation Age of Onset  Stroke Father  Kidney Disease Brother PHYSICAL EXAMINATION:   
Visit Vitals  /84  Pulse 70  
 Ht 5' 11\" (1.803 m)  Wt 156 lb (70.8 kg)  SpO2 98%  BMI 21.76 kg/m2 General:  Well defined, nourished, and groomed individual in no acute distress. Neck: Supple, nontender, no bruits, no pain with resistance to active range of motion. Heart: Regular rate and rhythm, no murmurs, rub, or gallop. Normal S1S2. Lungs:  Clear to auscultation bilaterally with equal chest expansion, no cough, no wheeze Musculoskeletal:  Extremities revealed no edema and had full range of motion of joints. Psych:  Good mood and bright affect NEUROLOGICAL EXAMINATION:    
Mental Status:   Alert and oriented to person, place, and time with recent and remote memory intact. Attention span and concentration are normal. Speech is fluent with a full fund of knowledge. Cranial Nerves:   
II, III, IV, VI:  Visual acuity grossly intact. Pupils are equal, round, and reactive to light. Extra-ocular movements are full and fluid. No ptosis or nystagmus. V-XII: Hearing is grossly intact. Facial features are symmetric, with normal sensation and strength. The palate rises symmetrically and the tongue protrudes midline. Sternocleidomastoids 5/5. Motor Examination: Normal tone, bulk, and strength, 5/5 muscle strength throughout. Coordination:  Finger to nose testing was normal.   No resting or intention tremor Gait and Station:  Steady while walking. Normal arm swing. No pronator drift. No muscle wasting or fasciculations noted. DTRs: 1+ and equal throughout ASSESSMENT AND PLAN 
  ICD-10-CM ICD-9-CM 1. Left sided numbness R20.0 782.0 MRI BRAIN W WO CONT 2. Demyelinating disease ([address] G37.9 341.9 MRI BRAIN W WO CONT 3. Cerebral microvascular disease I67.9 437.9 4. Essential hypertension I10 36.9   
 
18-year-old male seen in follow-up for hospitalization for left-sided numbness, this lasted a few days. MRI was significant for white matter changes possibly related to microvascular disease, but location and extent of lesions concerning for MS. Patient declined LP in the hospital, still was not interested. Has no history of other focal neuro complaints, although he does report some urinary urgency and occasional spells of muscle spasms that are brief and resolve on their own. 1.  We will repeat MRI and then follow-up with Dr. Renuka Loo 6 months posthospitalization 2. Patient was advised to notify us of any new weakness, numbness, sudden onset visual changes so we can speed up workup for MS if needed 3. Continue aspirin 81 mg, tight control of blood pressure for stroke prevention 4. LDL is borderline at 77, was discharged on Lipitor but will let patient's PCP monitor and decide whether he needs to remain on statin. He does have some carotid stenosis, but less than 50% Follow-up in 6 months, call sooner with concern Jonathan Infante NP This note was created using voice recognition software. Despite editing, there may be syntax errors.

## 2017-10-26 NOTE — PATIENT INSTRUCTIONS
Please schedule your MRI to be done at least a week before you see Dr. Renuka Loo, have the test done sometime between March 15 and April 2nd. 10 [facility]   Statement to Patients  April 1, 2014      In an effort to ensure the large volume of patient prescription refills is processed in the most efficient and expeditious manner, we are asking our patients to assist us by calling your Pharmacy for all prescription refills, this will include also your  Mail Order Pharmacy. The pharmacy will contact our office electronically to continue the refill process. Please do not wait until the last minute to call your pharmacy. We need at least 48 hours (2days) to fill prescriptions. We also encourage you to call your pharmacy before going to  your prescription to make sure it is ready. With regard to controlled substance prescription refill requests (narcotic refills) that need to be picked up at our office, we ask your cooperation by providing us with at least 72 hours (3days) notice that you will need a refill. We will not refill narcotic prescription refill requests after 4:00pm on any weekday, Monday through Thursday, or after 2:00pm on Fridays, or on the weekends. We encourage everyone to explore another way of getting your prescription refill request processed using [employer], our patient web portal through our electronic medical record system. [employer] is an efficient and effective way to communicate your medication request directly to the office and  downloadable as an missael on your smart phone . [employer] also features a review functionality that allows you to view your medication list as well as leave messages for your physician. Are you ready to get connected? If so please review the attatched instructions or speak to any of our staff to get you set up right away! Thank you so much for your cooperation.  Should you have any questions please contact our Practice Administrator. The Physicians and Staff,  [facility]          A Healthy Lifestyle: Care Instructions  Your Care Instructions    A healthy lifestyle can help you feel good, stay at a healthy weight, and have plenty of energy for both work and play. A healthy lifestyle is something you can share with your whole family. A healthy lifestyle also can lower your risk for serious health problems, such as high blood pressure, heart disease, and diabetes. You can follow a few steps listed below to improve your health and the health of your family. Follow-up care is a key part of your treatment and safety. Be sure to make and go to all appointments, and call your doctor if you are having problems. It's also a good idea to know your test results and keep a list of the medicines you take. How can you care for yourself at home? · Do not eat too much sugar, fat, or fast foods. You can still have dessert and treats now and then. The goal is moderation. · Start small to improve your eating habits. Pay attention to portion sizes, drink less juice and soda pop, and eat more fruits and vegetables. ¨ Eat a healthy amount of food. A 3-ounce serving of meat, for example, is about the size of a deck of cards. Fill the rest of your plate with vegetables and whole grains. ¨ Limit the amount of soda and sports drinks you have every day. Drink more water when you are thirsty. ¨ Eat at least 5 servings of fruits and vegetables every day. It may seem like a lot, but it is not hard to reach this goal. A serving or helping is 1 piece of fruit, 1 cup of vegetables, or 2 cups of leafy, raw vegetables. Have an apple or some carrot sticks as an afternoon snack instead of a candy bar. Try to have fruits and/or vegetables at every meal.  · Make exercise part of your daily routine. You may want to start with simple activities, such as walking, bicycling, or slow swimming.  Try to be active 30 to 60 minutes every day. You do not need to do all 30 to 60 minutes all at once. For example, you can exercise 3 times a day for 10 or 20 minutes. Moderate exercise is safe for most people, but it is always a good idea to talk to your doctor before starting an exercise program.  · Keep moving. Steve Knowles the lawn, work in the garden, or [employer]. Take the stairs instead of the elevator at work. · If you smoke, quit. People who smoke have an increased risk for heart attack, stroke, cancer, and other lung illnesses. Quitting is hard, but there are ways to boost your chance of quitting tobacco for good. ¨ Use nicotine gum, patches, or lozenges. ¨ Ask your doctor about stop-smoking programs and medicines. ¨ Keep trying. In addition to reducing your risk of diseases in the future, you will notice some benefits soon after you stop using tobacco. If you have shortness of breath or asthma symptoms, they will likely get better within a few weeks after you quit. · Limit how much alcohol you drink. Moderate amounts of alcohol (up to 2 drinks a day for men, 1 drink a day for women) are okay. But drinking too much can lead to liver problems, high blood pressure, and other health problems. Family health  If you have a family, there are many things you can do together to improve your health. · Eat meals together as a family as often as possible. · Eat healthy foods. This includes fruits, vegetables, lean meats and dairy, and whole grains. · Include your family in your fitness plan. Most people think of activities such as jogging or tennis as the way to fitness, but there are many ways you and your family can be more active. Anything that makes you breathe hard and gets your heart pumping is exercise. Here are some tips:  ¨ Walk to do errands or to take your child to school or the bus. ¨ Go for a family bike ride after dinner instead of watching TV. Where can you learn more? Go to http://phil-beverly.info/.   Enter Q120 in the search box to learn more about \"A Healthy Lifestyle: Care Instructions. \"  Current as of: May 12, 2017  Content Version: 11.4  © 4535-1501 [employer], [employer]. Care instructions adapted under license by [employer] (which disclaims liability or warranty for this information). If you have questions about a medical condition or this instruction, always ask your healthcare professional. [address] any warranty or liability for your use of this information.

## 2017-10-26 NOTE — PROGRESS NOTES
Date:            2017    Name:  Kaia Sharpe  :  1965  MRN:  [id number]     PCP:  Yrn Hargrove MD    Chief Complaint   Patient presents with   [facility] Follow Up         HISTORY OF PRESENT ILLNESS:  Xochitl Calvin is a 49 y.o., male who presents today for follow up for hospital follow-up. He was admitted for left-sided numbness and tingling. This lasted for a few days, his finger tips were still tingling in the hospital but he has not had any numbness or tingling since discharge. MRI showed heavy burden of nonspecific white matter changes and mild generalized volume loss, concerning for possible multiple sclerosis. Patient declined to have LP done for further workup while admitted, is still not interested. No history of focal neuro complaints, no significant visual changes now or ever. He saw his eye doctor recently for some blurred vision, this is been corrected with reading glasses. He has had few episodes of muscle spasms in the past that might last 3-5 minutes. He does complain of some bladder urgency, but thinks that he only notices this when he drinks coffee or tea not with soda or water. He has a h/o of hypertension, reports that it's well controlled now but he does not check it at home. No diabetes, mildly elevated LDL in the hospital so he was discharged on atorvastatin. PCP has not continued this medication. MRI Results (most recent):    Results from [place] encounter on 17   MRI BRAIN WO CONT   Narrative PRELIMINARY REPORT    No acute intracranial abnormality. Nonspecific white matter disease. Preliminary report was provided by Dr. Solmon Dandy, the on-call radiologist, at 1946  hours. Final report to follow. END PRELIMINARY REPORT    Study: MRI BRAIN WO CONT    Indication:  stroke    Additional clinical history:    Comparison: Head CT 2017. Contrast:  None administered. Technique:  The following sequences were obtained: Axial T1, T2, T2 FLAIR,  gradient echo; sagittal T1;   Coronal T2; diffusion-weighted imaging. Findings:  Mild generalized volume loss is noted. The ventricles and sulci are otherwise  normal in appearance. There is no mass effect or midline shift. There is no  intracranial hemorrhage. Fairly extensive T2 hyperintense periventricular white  matter signal changes are noted for the patient's age. In addition, many of  these foci are oriented perpendicular to the lateral ventricles. There is  evidence of prior lacunar infarct in the left basal ganglia. The basilar  cisterns are patent. Diffusion-weighted imaging demonstrates no evidence of diffusion restriction. There are normal appearing vascular flow voids. The orbits are intact. Normal signal is seen in the paranasal sinuses and  mastoid air cells. Impression Impression:  Nonspecific, but fairly extensive white matter signal changes, especially given  the patient's age. This is seen in conjunction with mild generalized volume  loss. Although these signal changes are nonspecific, and could be explained by  small vessel ischemic disease changes especially in the setting of uncontrolled  hypertension. However given the perpendicular orientation of the T2 hyperintense  foci relative to the lateral ventricles, a demyelinating process should also be  considered in the differential diagnosis. Carotids  Right:  Mild plaque noted in the right carotid system. Right ICA velocities suggest less than 50% diameter reduction. Right vertebral artery flow is antegrade. Left:  Mild plaque noted in the left carotid system. Left ICA velocities suggest less than 50% diameter reduction. Left vertebral artery flow is antegrade.      TTE  Left ventricle: Systolic function was normal. Ejection fraction was  estimated in the range of 55 % to 60 %. No obvious wall motion  abnormalities identified in the views obtained. Tricuspid valve:  There was mild regurgitation. LDL 77  A1c 5.4  /104    9.20.2017 recap  Blue Oconnor is a 49 y.o. male who presents to the hospital because of  left-sided numbness. The patient states that when he was going to work he noticed numbness in his left hand and feet which progressed over the next few hours and he became concerned and came to the hospital.     He did have MRI of the brain which did show possibility of multiple sclerosis. It did not show any acute stroke. Current Outpatient Prescriptions   Medication Sig    aspirin 81 mg chewable tablet Take 1 Tab by mouth daily.  atorvastatin (LIPITOR) 10 mg tablet Take 1 Tab by mouth nightly.  amLODIPine (NORVASC) 2.5 mg tablet Take 1 Tab by mouth daily.  ibuprofen (MOTRIN) 200 mg tablet Take 400 mg by mouth every six (6) hours as needed for Pain.  MULTIVIT-MINERALS/FA/LYCOPENE (ONE-A-DAY MEN'S MULTIVITAMIN PO) Take 1 Tab by mouth daily. No current facility-administered medications for this visit. No Known Allergies  Past Medical History:   Diagnosis Date    Hypertension     Liver disease     Stroke (cerebrum) ([facility] [address] 9/20/2017     Past Surgical History:   Procedure Laterality Date    HX ORTHOPAEDIC Right 1998    Rt hip repair     Social History     Social History    Marital status: SINGLE     Spouse name: N/A    Number of children: N/A    Years of education: N/A     Occupational History    Not on file.      Social History Main Topics    Smoking status: Current Every Day Smoker     Packs/day: 0.25    Smokeless tobacco: Never Used    Alcohol use No    Drug use: No    Sexual activity: Yes     Partners: Female     Other Topics Concern    Not on file     Social History Narrative     Family History   Problem Relation Age of Onset    Stroke Father     Kidney Disease Brother          PHYSICAL EXAMINATION:    Visit Vitals    /84    Pulse 70    Ht 5' 11\" (1.803 m)    Wt 156 lb (70.8 kg)    SpO2 98%    BMI 21.76 kg/m2     General: Well defined, nourished, and groomed individual in no acute distress. Neck: Supple, nontender, no bruits, no pain with resistance to active range of motion. Heart: Regular rate and rhythm, no murmurs, rub, or gallop. Normal S1S2. Lungs:  Clear to auscultation bilaterally with equal chest expansion, no cough, no wheeze  Musculoskeletal:  Extremities revealed no edema and had full range of motion of joints. Psych:  Good mood and bright affect    NEUROLOGICAL EXAMINATION:     Mental Status:   Alert and oriented to person, place, and time with recent and remote memory intact. Attention span and concentration are normal. Speech is fluent with a full fund of knowledge. Cranial Nerves:    II, III, IV, VI:  Visual acuity grossly intact. Pupils are equal, round, and reactive to light. Extra-ocular movements are full and fluid. No ptosis or nystagmus. V-XII: Hearing is grossly intact. Facial features are symmetric, with normal sensation and strength. The palate rises symmetrically and the tongue protrudes midline. Sternocleidomastoids 5/5. Motor Examination: Normal tone, bulk, and strength, 5/5 muscle strength throughout. Coordination:  Finger to nose testing was normal.   No resting or intention tremor  Gait and Station:  Steady while walking. Normal arm swing. No pronator drift. No muscle wasting or fasciculations noted. DTRs: 1+ and equal throughout      ASSESSMENT AND PLAN    ICD-10-CM ICD-9-CM    1. Left sided numbness R20.0 782.0 MRI BRAIN W WO CONT   2. Demyelinating disease ([address] G37.9 341.9 MRI BRAIN W WO CONT   3. Cerebral microvascular disease I67.9 437.9    4. Essential hypertension I10 36.9      73-year-old male seen in follow-up for hospitalization for left-sided numbness, this lasted a few days. MRI was significant for white matter changes possibly related to microvascular disease, but location and extent of lesions concerning for MS.   Patient declined LP in the hospital, still was not interested. Has no history of other focal neuro complaints, although he does report some urinary urgency and occasional spells of muscle spasms that are brief and resolve on their own. 1.  We will repeat MRI and then follow-up with Dr. Da Reyes 6 months posthospitalization  2. Patient was advised to notify us of any new weakness, numbness, sudden onset visual changes so we can speed up workup for MS if needed  3. Continue aspirin 81 mg, tight control of blood pressure for stroke prevention  4. LDL is borderline at 77, was discharged on Lipitor but will let patient's PCP monitor and decide whether he needs to remain on statin. He does have some carotid stenosis, but less than 50%    Follow-up in 6 months, call sooner with concern      Erik Ahn NP    This note was created using voice recognition software. Despite editing, there may be syntax errors.

## 2021-06-24 NOTE — PROGRESS NOTES
Patient discharged to home via private vehicle. Discharge instructions reviewed including medications and follow up instructions. Opportunity for questions presented. Patient indicated an understanding.  All lines removed Yes - the patient is able to be screened